# Patient Record
Sex: FEMALE | Race: WHITE | NOT HISPANIC OR LATINO | Employment: OTHER | URBAN - METROPOLITAN AREA
[De-identification: names, ages, dates, MRNs, and addresses within clinical notes are randomized per-mention and may not be internally consistent; named-entity substitution may affect disease eponyms.]

---

## 2017-12-12 ENCOUNTER — HISTORICAL (OUTPATIENT)
Dept: INTERNAL MEDICINE | Facility: CLINIC | Age: 62
End: 2017-12-12

## 2017-12-12 LAB
ABS NEUT (OLG): 4.36 X10(3)/MCL (ref 2.1–9.2)
ALBUMIN SERPL-MCNC: 3.3 GM/DL (ref 3.4–5)
ALBUMIN/GLOB SERPL: 1 RATIO (ref 1–2)
ALP SERPL-CCNC: 87 UNIT/L (ref 45–117)
ALT SERPL-CCNC: 19 UNIT/L (ref 12–78)
AST SERPL-CCNC: 13 UNIT/L (ref 15–37)
BASOPHILS # BLD AUTO: 0.03 X10(3)/MCL
BASOPHILS NFR BLD AUTO: 0 % (ref 0–1)
BILIRUB SERPL-MCNC: 0.2 MG/DL (ref 0.2–1)
BILIRUBIN DIRECT+TOT PNL SERPL-MCNC: <0.1 MG/DL
BILIRUBIN DIRECT+TOT PNL SERPL-MCNC: ABNORMAL MG/DL
BUN SERPL-MCNC: 13 MG/DL (ref 7–18)
CALCIUM SERPL-MCNC: 8.3 MG/DL (ref 8.5–10.1)
CHLORIDE SERPL-SCNC: 107 MMOL/L (ref 98–107)
CHOLEST SERPL-MCNC: 181 MG/DL
CHOLEST/HDLC SERPL: 2.9 {RATIO} (ref 0–4.4)
CO2 SERPL-SCNC: 28 MMOL/L (ref 21–32)
CREAT SERPL-MCNC: 0.8 MG/DL (ref 0.6–1.3)
EOSINOPHIL # BLD AUTO: 0.02 X10(3)/MCL
EOSINOPHIL NFR BLD AUTO: 0 % (ref 0–5)
ERYTHROCYTE [DISTWIDTH] IN BLOOD BY AUTOMATED COUNT: 11.1 % (ref 11.5–14.5)
EST. AVERAGE GLUCOSE BLD GHB EST-MCNC: 143 MG/DL
GLOBULIN SER-MCNC: 4 GM/ML (ref 2.3–3.5)
GLUCOSE SERPL-MCNC: 213 MG/DL (ref 74–106)
HBA1C MFR BLD: 6.6 % (ref 4.2–6.3)
HCT VFR BLD AUTO: 37.1 % (ref 35–46)
HDLC SERPL-MCNC: 62 MG/DL
HGB BLD-MCNC: 12.5 GM/DL (ref 12–16)
IMM GRANULOCYTES # BLD AUTO: 0.01 10*3/UL
IMM GRANULOCYTES NFR BLD AUTO: 0 %
LDLC SERPL CALC-MCNC: 88 MG/DL (ref 0–130)
LYMPHOCYTES # BLD AUTO: 1.77 X10(3)/MCL
LYMPHOCYTES NFR BLD AUTO: 27 % (ref 15–40)
MAGNESIUM SERPL-MCNC: 1.9 MG/DL (ref 1.8–2.4)
MCH RBC QN AUTO: 32 PG (ref 26–34)
MCHC RBC AUTO-ENTMCNC: 33.7 GM/DL (ref 31–37)
MCV RBC AUTO: 94.9 FL (ref 80–100)
MONOCYTES # BLD AUTO: 0.27 X10(3)/MCL
MONOCYTES NFR BLD AUTO: 4 % (ref 4–12)
NEUTROPHILS # BLD AUTO: 4.36 X10(3)/MCL
NEUTROPHILS NFR BLD AUTO: 67 X10(3)/MCL
PLATELET # BLD AUTO: 281 X10(3)/MCL (ref 130–400)
PMV BLD AUTO: 8.9 FL (ref 7.4–10.4)
POTASSIUM SERPL-SCNC: 3.7 MMOL/L (ref 3.5–5.1)
PROT SERPL-MCNC: 7.3 GM/DL (ref 6.4–8.2)
RBC # BLD AUTO: 3.91 X10(6)/MCL (ref 4–5.2)
SODIUM SERPL-SCNC: 142 MMOL/L (ref 136–145)
T4 FREE SERPL-MCNC: 0.75 NG/DL (ref 0.76–1.46)
TRIGL SERPL-MCNC: 155 MG/DL
TSH SERPL-ACNC: 0.28 MIU/L (ref 0.36–3.74)
VLDLC SERPL CALC-MCNC: 31 MG/DL
WBC # SPEC AUTO: 6.5 X10(3)/MCL (ref 4.5–11)

## 2019-04-15 ENCOUNTER — HISTORICAL (OUTPATIENT)
Dept: ADMINISTRATIVE | Facility: HOSPITAL | Age: 64
End: 2019-04-15

## 2022-04-07 ENCOUNTER — HISTORICAL (OUTPATIENT)
Dept: ADMINISTRATIVE | Facility: HOSPITAL | Age: 67
End: 2022-04-07

## 2022-04-24 VITALS
HEIGHT: 61 IN | BODY MASS INDEX: 24.77 KG/M2 | SYSTOLIC BLOOD PRESSURE: 152 MMHG | WEIGHT: 131.19 LBS | DIASTOLIC BLOOD PRESSURE: 87 MMHG

## 2022-05-05 ENCOUNTER — HOSPITAL ENCOUNTER (INPATIENT)
Facility: HOSPITAL | Age: 67
LOS: 4 days | Discharge: HOME | DRG: 683 | End: 2022-05-10
Attending: EMERGENCY MEDICINE | Admitting: INTERNAL MEDICINE
Payer: MEDICARE

## 2022-05-05 DIAGNOSIS — N17.9 ACUTE RENAL FAILURE: ICD-10-CM

## 2022-05-05 DIAGNOSIS — E86.0 ACUTE DEHYDRATION: Primary | ICD-10-CM

## 2022-05-05 DIAGNOSIS — N17.9 AKI (ACUTE KIDNEY INJURY): ICD-10-CM

## 2022-05-05 DIAGNOSIS — R07.9 CHEST PAIN: ICD-10-CM

## 2022-05-05 DIAGNOSIS — N18.4 CKD (CHRONIC KIDNEY DISEASE), STAGE IV: ICD-10-CM

## 2022-05-05 LAB
ALBUMIN SERPL-MCNC: 3.8 GM/DL (ref 3.4–4.8)
ALBUMIN/GLOB SERPL: 0.8 RATIO (ref 1.1–2)
ALP SERPL-CCNC: 109 UNIT/L (ref 40–150)
ALT SERPL-CCNC: 6 UNIT/L (ref 0–55)
AST SERPL-CCNC: 13 UNIT/L (ref 5–34)
BASOPHILS # BLD AUTO: 0.04 X10(3)/MCL (ref 0–0.2)
BASOPHILS NFR BLD AUTO: 0.5 %
BILIRUBIN DIRECT+TOT PNL SERPL-MCNC: 0.2 MG/DL (ref 0–0.5)
BILIRUBIN DIRECT+TOT PNL SERPL-MCNC: 0.3 MG/DL (ref 0–0.8)
BILIRUBIN DIRECT+TOT PNL SERPL-MCNC: 0.5 MG/DL
BUN SERPL-MCNC: 74.3 MG/DL (ref 9.8–20.1)
CALCIUM SERPL-MCNC: 9 MG/DL (ref 8.4–10.2)
CHLORIDE SERPL-SCNC: 105 MMOL/L (ref 98–107)
CO2 SERPL-SCNC: 16 MMOL/L (ref 23–31)
CREAT SERPL-MCNC: 6.59 MG/DL (ref 0.55–1.02)
EOSINOPHIL # BLD AUTO: 0.09 X10(3)/MCL (ref 0–0.9)
EOSINOPHIL NFR BLD AUTO: 1.1 %
ERYTHROCYTE [DISTWIDTH] IN BLOOD BY AUTOMATED COUNT: 13.5 % (ref 11.5–17)
GLOBULIN SER-MCNC: 4.6 GM/DL (ref 2.4–3.5)
GLUCOSE SERPL-MCNC: 111 MG/DL (ref 82–115)
HCT VFR BLD AUTO: 33.1 % (ref 37–47)
HGB BLD-MCNC: 10.2 GM/DL (ref 12–16)
IMM GRANULOCYTES # BLD AUTO: 0.04 X10(3)/MCL (ref 0–0.02)
IMM GRANULOCYTES NFR BLD AUTO: 0.5 % (ref 0–0.43)
LYMPHOCYTES # BLD AUTO: 1.82 X10(3)/MCL (ref 0.6–4.6)
LYMPHOCYTES NFR BLD AUTO: 21.3 %
MAGNESIUM SERPL-MCNC: 1.5 MG/DL (ref 1.6–2.6)
MCH RBC QN AUTO: 30.1 PG (ref 27–31)
MCHC RBC AUTO-ENTMCNC: 30.8 MG/DL (ref 33–36)
MCV RBC AUTO: 97.6 FL (ref 80–94)
MONOCYTES # BLD AUTO: 0.56 X10(3)/MCL (ref 0.1–1.3)
MONOCYTES NFR BLD AUTO: 6.6 %
NEUTROPHILS # BLD AUTO: 6 X10(3)/MCL (ref 2.1–9.2)
NEUTROPHILS NFR BLD AUTO: 70 %
NRBC BLD AUTO-RTO: 0 %
PLATELET # BLD AUTO: 253 X10(3)/MCL (ref 130–400)
PMV BLD AUTO: 10.5 FL (ref 9.4–12.4)
POTASSIUM SERPL-SCNC: 4.6 MMOL/L (ref 3.5–5.1)
PROT SERPL-MCNC: 8.4 GM/DL (ref 5.8–7.6)
RBC # BLD AUTO: 3.39 X10(6)/MCL (ref 4.2–5.4)
SODIUM SERPL-SCNC: 138 MMOL/L (ref 136–145)
TSH SERPL-ACNC: 0.38 UIU/ML (ref 0.35–4.94)
WBC # SPEC AUTO: 8.5 X10(3)/MCL (ref 4.5–11.5)

## 2022-05-05 PROCEDURE — 84443 ASSAY THYROID STIM HORMONE: CPT | Performed by: PHYSICIAN ASSISTANT

## 2022-05-05 PROCEDURE — 96360 HYDRATION IV INFUSION INIT: CPT

## 2022-05-05 PROCEDURE — 80053 COMPREHEN METABOLIC PANEL: CPT | Performed by: PHYSICIAN ASSISTANT

## 2022-05-05 PROCEDURE — 96361 HYDRATE IV INFUSION ADD-ON: CPT

## 2022-05-05 PROCEDURE — 99292 CRITICAL CARE ADDL 30 MIN: CPT

## 2022-05-05 PROCEDURE — 99291 CRITICAL CARE FIRST HOUR: CPT | Mod: 25

## 2022-05-05 PROCEDURE — 85025 COMPLETE CBC W/AUTO DIFF WBC: CPT | Performed by: PHYSICIAN ASSISTANT

## 2022-05-05 PROCEDURE — 83735 ASSAY OF MAGNESIUM: CPT | Performed by: PHYSICIAN ASSISTANT

## 2022-05-05 NOTE — Clinical Note
Diagnosis: Acute renal failure [184477]   Admitting Provider:: KENTRELL MARTINES [[id number]]   Future Attending Provider: KENTRELL MARTINES [[id number]]   Reason for IP Medical Treatment  (Clinical interventions that can only be accomplished in the IP setting? ) :: nephrology consult   Estimated Length of Stay:: 3-4 midnights   I certify that Inpatient services for greater than or equal to 2 midnights are medically necessary:: Yes   Plans for Post-Acute care--if anticipated (pick the single best option):: A. No post acute care anticipated at this time

## 2022-05-06 LAB
AMPHET UR QL SCN: NEGATIVE
APPEARANCE UR: CLEAR
BACTERIA #/AREA URNS AUTO: ABNORMAL /HPF
BARBITURATE SCN PRESENT UR: NEGATIVE
BENZODIAZ UR QL SCN: NEGATIVE
BILIRUB UR QL STRIP.AUTO: NEGATIVE MG/DL
C3 SERPL-MCNC: 133 MG/DL (ref 80–173)
C4 SERPL-MCNC: 23.3 MG/DL (ref 13–46)
CA-I SERPL-MCNC: 1.1 MMOL/L
CANNABINOIDS UR QL SCN: NEGATIVE
COCAINE UR QL SCN: NEGATIVE
COLOR UR AUTO: YELLOW
DEPRECATED CALCIDIOL+CALCIFEROL SERPL-MC: 17.4 NG/ML (ref 30–80)
FENTANYL UR QL SCN: NEGATIVE
FERRITIN SERPL-MCNC: 187.38 NG/ML (ref 4.63–204)
FOLATE SERPL-MCNC: 11.3 NG/ML (ref 7–31.4)
GLUCOSE UR QL STRIP.AUTO: NEGATIVE MG/DL
HCO3 VENOUS: 20.2 MMOL/L (ref 22–29)
HIV 1+2 AB+HIV1 P24 AG SERPL QL IA: NONREACTIVE
KETONES UR QL STRIP.AUTO: NEGATIVE MG/DL
LEUKOCYTE ESTERASE UR QL STRIP.AUTO: ABNORMAL UNIT/L
MDMA UR QL SCN: NEGATIVE
NITRITE UR QL STRIP.AUTO: NEGATIVE
O2 SATURATION VENOUS: 76 % (ref 60–85)
OPIATES UR QL SCN: NEGATIVE
PCO2 VENOUS: 46 MM HG (ref 38–51)
PCP UR QL: NEGATIVE
PH UR STRIP.AUTO: 6 [PH]
PH UR: 6 [PH] (ref 3–11)
PH VENOUS: 7.25 (ref 7.33–7.43)
PO2 VENOUS: 48 MM HG (ref 25–29)
POCT GLUCOSE: 184 MG/DL (ref 70–110)
PROT UR QL STRIP.AUTO: NEGATIVE MG/DL
PTH-INTACT SERPL-MCNC: 384.4 PG/ML (ref 8.7–77)
RBC #/AREA URNS AUTO: ABNORMAL /HPF
RBC UR QL AUTO: NEGATIVE UNIT/L
SARS-COV-2 RDRP RESP QL NAA+PROBE: NEGATIVE
SODIUM BLD-SCNC: 139 MMOL/L (ref 137–147)
SP GR UR STRIP.AUTO: 1.01 (ref 1–1.03)
SPECIFIC GRAVITY, URINE AUTO (.000) (OHS): 1.01 (ref 1–1.03)
SQUAMOUS #/AREA URNS AUTO: ABNORMAL /LPF
UROBILINOGEN UR STRIP-ACNC: 0.2 MG/DL
VIT B12 SERPL-MCNC: 310 PG/ML (ref 213–816)
WBC #/AREA URNS AUTO: ABNORMAL /HPF

## 2022-05-06 PROCEDURE — 86160 COMPLEMENT ANTIGEN: CPT | Performed by: INTERNAL MEDICINE

## 2022-05-06 PROCEDURE — 82728 ASSAY OF FERRITIN: CPT | Performed by: INTERNAL MEDICINE

## 2022-05-06 PROCEDURE — 86225 DNA ANTIBODY NATIVE: CPT | Performed by: INTERNAL MEDICINE

## 2022-05-06 PROCEDURE — 82784 ASSAY IGA/IGD/IGG/IGM EACH: CPT | Performed by: STUDENT IN AN ORGANIZED HEALTH CARE EDUCATION/TRAINING PROGRAM

## 2022-05-06 PROCEDURE — 81015 MICROSCOPIC EXAM OF URINE: CPT | Performed by: PHYSICIAN ASSISTANT

## 2022-05-06 PROCEDURE — 25000003 PHARM REV CODE 250: Performed by: EMERGENCY MEDICINE

## 2022-05-06 PROCEDURE — 11000001 HC ACUTE MED/SURG PRIVATE ROOM

## 2022-05-06 PROCEDURE — 83883 ASSAY NEPHELOMETRY NOT SPEC: CPT | Performed by: STUDENT IN AN ORGANIZED HEALTH CARE EDUCATION/TRAINING PROGRAM

## 2022-05-06 PROCEDURE — 63600175 PHARM REV CODE 636 W HCPCS: Performed by: PHYSICIAN ASSISTANT

## 2022-05-06 PROCEDURE — 36415 COLL VENOUS BLD VENIPUNCTURE: CPT | Performed by: INTERNAL MEDICINE

## 2022-05-06 PROCEDURE — 86140 C-REACTIVE PROTEIN: CPT | Performed by: INTERNAL MEDICINE

## 2022-05-06 PROCEDURE — 36415 COLL VENOUS BLD VENIPUNCTURE: CPT | Performed by: PHYSICIAN ASSISTANT

## 2022-05-06 PROCEDURE — 82306 VITAMIN D 25 HYDROXY: CPT | Performed by: INTERNAL MEDICINE

## 2022-05-06 PROCEDURE — 83970 ASSAY OF PARATHORMONE: CPT | Performed by: INTERNAL MEDICINE

## 2022-05-06 PROCEDURE — 87389 HIV-1 AG W/HIV-1&-2 AB AG IA: CPT | Performed by: STUDENT IN AN ORGANIZED HEALTH CARE EDUCATION/TRAINING PROGRAM

## 2022-05-06 PROCEDURE — 80307 DRUG TEST PRSMV CHEM ANLYZR: CPT | Performed by: PHYSICIAN ASSISTANT

## 2022-05-06 PROCEDURE — 81003 URINALYSIS AUTO W/O SCOPE: CPT | Mod: 59 | Performed by: PHYSICIAN ASSISTANT

## 2022-05-06 PROCEDURE — 87635 SARS-COV-2 COVID-19 AMP PRB: CPT | Performed by: EMERGENCY MEDICINE

## 2022-05-06 PROCEDURE — 83540 ASSAY OF IRON: CPT | Performed by: INTERNAL MEDICINE

## 2022-05-06 PROCEDURE — 82746 ASSAY OF FOLIC ACID SERUM: CPT | Performed by: INTERNAL MEDICINE

## 2022-05-06 PROCEDURE — 84165 PROTEIN E-PHORESIS SERUM: CPT | Performed by: STUDENT IN AN ORGANIZED HEALTH CARE EDUCATION/TRAINING PROGRAM

## 2022-05-06 PROCEDURE — 82607 VITAMIN B-12: CPT | Performed by: PHYSICIAN ASSISTANT

## 2022-05-06 RX ORDER — AMLODIPINE BESYLATE 10 MG/1
10 TABLET ORAL DAILY
COMMUNITY

## 2022-05-06 RX ORDER — DIPHENOXYLATE HYDROCHLORIDE AND ATROPINE SULFATE 2.5; .025 MG/1; MG/1
1 TABLET ORAL EVERY 6 HOURS PRN
Status: DISCONTINUED | OUTPATIENT
Start: 2022-05-06 | End: 2022-05-10 | Stop reason: HOSPADM

## 2022-05-06 RX ORDER — GLUCAGON 1 MG
1 KIT INJECTION
Status: DISCONTINUED | OUTPATIENT
Start: 2022-05-06 | End: 2022-05-10 | Stop reason: HOSPADM

## 2022-05-06 RX ORDER — BUPROPION HYDROCHLORIDE 150 MG/1
150 TABLET ORAL DAILY
Status: DISCONTINUED | OUTPATIENT
Start: 2022-05-07 | End: 2022-05-10 | Stop reason: HOSPADM

## 2022-05-06 RX ORDER — BUPRENORPHINE AND NALOXONE 8; 2 MG/1; MG/1
1 FILM, SOLUBLE BUCCAL; SUBLINGUAL 2 TIMES DAILY
COMMUNITY
Start: 2022-03-31

## 2022-05-06 RX ORDER — BUPRENORPHINE AND NALOXONE 8; 2 MG/1; MG/1
1 FILM, SOLUBLE BUCCAL; SUBLINGUAL 2 TIMES DAILY
Status: CANCELLED | OUTPATIENT
Start: 2022-05-06

## 2022-05-06 RX ORDER — PHENOL/SODIUM PHENOLATE
1 AEROSOL, SPRAY (ML) MUCOUS MEMBRANE EVERY MORNING
COMMUNITY
Start: 2021-11-11

## 2022-05-06 RX ORDER — CYCLOBENZAPRINE HCL 5 MG
5 TABLET ORAL 3 TIMES DAILY PRN
Status: DISCONTINUED | OUTPATIENT
Start: 2022-05-06 | End: 2022-05-10 | Stop reason: HOSPADM

## 2022-05-06 RX ORDER — SODIUM CHLORIDE 0.9 % (FLUSH) 0.9 %
10 SYRINGE (ML) INJECTION EVERY 12 HOURS PRN
Status: DISCONTINUED | OUTPATIENT
Start: 2022-05-06 | End: 2022-05-10 | Stop reason: HOSPADM

## 2022-05-06 RX ORDER — PANTOPRAZOLE SODIUM 40 MG/1
40 TABLET, DELAYED RELEASE ORAL DAILY
Status: DISCONTINUED | OUTPATIENT
Start: 2022-05-07 | End: 2022-05-10 | Stop reason: HOSPADM

## 2022-05-06 RX ORDER — LISINOPRIL 10 MG/1
10 TABLET ORAL NIGHTLY
Status: ON HOLD | COMMUNITY
Start: 2022-02-07 | End: 2022-05-10 | Stop reason: HOSPADM

## 2022-05-06 RX ORDER — MAGNESIUM SULFATE 1 G/100ML
1 INJECTION INTRAVENOUS ONCE
Status: COMPLETED | OUTPATIENT
Start: 2022-05-06 | End: 2022-05-06

## 2022-05-06 RX ORDER — BUPRENORPHINE AND NALOXONE 8; 2 MG/1; MG/1
1 FILM, SOLUBLE BUCCAL; SUBLINGUAL 2 TIMES DAILY
Status: DISCONTINUED | OUTPATIENT
Start: 2022-05-06 | End: 2022-05-06

## 2022-05-06 RX ORDER — NALOXONE HCL 0.4 MG/ML
0.02 VIAL (ML) INJECTION
Status: DISCONTINUED | OUTPATIENT
Start: 2022-05-06 | End: 2022-05-10 | Stop reason: HOSPADM

## 2022-05-06 RX ORDER — DIPHENOXYLATE HYDROCHLORIDE AND ATROPINE SULFATE 2.5; .025 MG/1; MG/1
1 TABLET ORAL EVERY 6 HOURS PRN
COMMUNITY
Start: 2022-04-25

## 2022-05-06 RX ORDER — IBUPROFEN 200 MG
24 TABLET ORAL
Status: DISCONTINUED | OUTPATIENT
Start: 2022-05-06 | End: 2022-05-10 | Stop reason: HOSPADM

## 2022-05-06 RX ORDER — ASPIRIN 325 MG
TABLET, DELAYED RELEASE (ENTERIC COATED) ORAL
COMMUNITY

## 2022-05-06 RX ORDER — BUPROPION HYDROCHLORIDE 150 MG/1
150 TABLET ORAL DAILY
COMMUNITY

## 2022-05-06 RX ORDER — CYCLOBENZAPRINE HCL 5 MG
5 TABLET ORAL
Status: ON HOLD | COMMUNITY
End: 2022-05-10 | Stop reason: HOSPADM

## 2022-05-06 RX ORDER — INSULIN ASPART 100 [IU]/ML
0-5 INJECTION, SOLUTION INTRAVENOUS; SUBCUTANEOUS
Status: DISCONTINUED | OUTPATIENT
Start: 2022-05-06 | End: 2022-05-10 | Stop reason: HOSPADM

## 2022-05-06 RX ORDER — GABAPENTIN 800 MG/1
800 TABLET ORAL 2 TIMES DAILY
COMMUNITY
Start: 2022-04-17

## 2022-05-06 RX ORDER — ONDANSETRON 2 MG/ML
4 INJECTION INTRAMUSCULAR; INTRAVENOUS EVERY 8 HOURS PRN
Status: DISCONTINUED | OUTPATIENT
Start: 2022-05-06 | End: 2022-05-10 | Stop reason: HOSPADM

## 2022-05-06 RX ORDER — AMLODIPINE BESYLATE 5 MG/1
10 TABLET ORAL DAILY
Status: DISCONTINUED | OUTPATIENT
Start: 2022-05-07 | End: 2022-05-10 | Stop reason: HOSPADM

## 2022-05-06 RX ORDER — ACETAMINOPHEN 325 MG/1
650 TABLET ORAL EVERY 4 HOURS PRN
Status: DISCONTINUED | OUTPATIENT
Start: 2022-05-06 | End: 2022-05-10 | Stop reason: HOSPADM

## 2022-05-06 RX ORDER — IBUPROFEN 200 MG
16 TABLET ORAL
Status: DISCONTINUED | OUTPATIENT
Start: 2022-05-06 | End: 2022-05-10 | Stop reason: HOSPADM

## 2022-05-06 RX ORDER — SODIUM CHLORIDE 9 MG/ML
1000 INJECTION, SOLUTION INTRAVENOUS
Status: COMPLETED | OUTPATIENT
Start: 2022-05-06 | End: 2022-05-06

## 2022-05-06 RX ORDER — BUPRENORPHINE HYDROCHLORIDE 8 MG/1
8 TABLET SUBLINGUAL 2 TIMES DAILY
Status: DISCONTINUED | OUTPATIENT
Start: 2022-05-06 | End: 2022-05-10 | Stop reason: HOSPADM

## 2022-05-06 RX ORDER — DULOXETIN HYDROCHLORIDE 30 MG/1
30 CAPSULE, DELAYED RELEASE ORAL NIGHTLY
Status: ON HOLD | COMMUNITY
End: 2022-05-10 | Stop reason: HOSPADM

## 2022-05-06 RX ORDER — HYDROCODONE BITARTRATE AND ACETAMINOPHEN 5; 325 MG/1; MG/1
1 TABLET ORAL EVERY 6 HOURS PRN
Status: DISCONTINUED | OUTPATIENT
Start: 2022-05-06 | End: 2022-05-10 | Stop reason: HOSPADM

## 2022-05-06 RX ORDER — CHLORTHALIDONE 25 MG/1
25 TABLET ORAL NIGHTLY
Status: ON HOLD | COMMUNITY
End: 2022-05-10 | Stop reason: HOSPADM

## 2022-05-06 RX ADMIN — MAGNESIUM SULFATE IN DEXTROSE 1 G: 10 INJECTION, SOLUTION INTRAVENOUS at 11:05

## 2022-05-06 RX ADMIN — SODIUM CHLORIDE 1000 ML: 9 INJECTION, SOLUTION INTRAVENOUS at 03:05

## 2022-05-06 RX ADMIN — SODIUM BICARBONATE: 84 INJECTION, SOLUTION INTRAVENOUS at 06:05

## 2022-05-06 RX ADMIN — SODIUM CHLORIDE 1000 ML: 9 INJECTION, SOLUTION INTRAVENOUS at 02:05

## 2022-05-06 NOTE — H&P
[facility] Hospital Medicine History & Physical Examination       Patient Name: Kriss Vallejo  MRN: [id number]  Patient Class: IP- Inpatient   Admission Date: 5/5/2022  8:43 PM  Admitting Physician: Dr. Hernandez  Length of Stay: 0  Attending Physician: Dr. Hernandez  Primary Care Provider: SHERINE Cano  Face-to-Face encounter date: 05/06/2022  Code Status: Full code  Chief Complaint: tremors    Patient information was obtained from patient, patient's family, past medical records and ER records.     HISTORY OF PRESENT ILLNESS:   Kriss Vallejo is a 66 y.o. female who has a history of hypertension,chronic back pain on neurontin and suboxone, and tobacco abuse who presented to [facility] on 5/5/2022 with  complaints of constant generalized tremors mostly in her bilateral upper extremities, forgetfulness, nausea, and persistent, non-bloody diarrhea over the past x3-4 months without any specific preceding factors. She states that she decided to come to the hospital today because her daughter had to go to the hospital for an OB issue and she figured she would get evaluated. She denies any sick contacts or recent travel. She denies any personal or family history of neurological, GI, or kidney issues in the past. She continues to eat and drink normally at home and urinates without difficulty. She admits to smoking a half a pack of cigarettes daily and occasional alcohol use, but denies illicit drugs. She denies CP, SOB, cough, fever, vomiting, hematuria, blood in her stool, abdominal pain, constipation, lower extremity numbness/tingling, urinary/bowel incontinence, or dysuria.     Her initial vital signs were stable.  Her labs showed a bicarb 16, BUN 74.3, creatinine 6.59, and a magnesium 1.50.  A VBG showed a pH 7.25, HCO3 20.2, pO 48, and a pCO2 of 46. COVID-19 negative.  PAST MEDICAL HISTORY:   Hypertension  Tobacco use  Chronic back pain  RLE sciatica  PAST SURGICAL HISTORY:   BACK  "SURGERY   Biopsy of liver   Exploratory laparotomy with biopsy   Hysterectomy    LIVER SURGERY   mediport insertion and removal  ALLERGIES:   Patient has no known allergies.    FAMILY HISTORY:   Alcoholism.: Father.   Cancer, HTN: Mother.   Cancer - unknown origin: Sister.     SOCIAL HISTORY:   Alcohol- occasional  Tobacco use- half a pack daily  Illicit drugs- previous narcotic abuse on suboxone    HOME MEDICATIONS:     Prior to Admission medications    Not on File       REVIEW OF SYSTEMS:   Except as documented, all other systems reviewed and negative     PHYSICAL EXAM:   /75   Pulse 74   Temp 98.4 °F (36.9 °C) (Oral)   Resp 18   Ht 5' 0.63" (1.54 m)   Wt 59 kg (130 lb 1.1 oz)   SpO2 97%   BMI 24.88 kg/m²     Vitals Reviewed  General: well-developed well-nourished in no acute distress, AAOx3, answers all questions appropriately although sometimes she does take a while to answer  Eye: clear conjunctiva, eyelids normal  HENT: oropharynx and nasal mucosal surfaces moist  Neck: full range of motion  Respiratory: clear to auscultation bilaterally, no respiratory distress  Cardiovascular: regular rate and rhythm without gallops or rubs  Gastrointestinal: soft, non-tender, non-distended with normal bowel sounds, without masses to palpation  Genitourinary: no CVA tenderness to palpation  Musculoskeletal:5/5 strength in bilateral upper extremities, 3/5 strength in bilateral lower extremities with positive straight leg raise bilaterally  Integumentary: warm, dry  Neurologic: cranial nerves intact, sensation intact, no active tremors noted    LABS AND IMAGING:     Admission on 05/05/2022   Component Date Value    Sodium Level 05/05/2022 138     Potassium Level 05/05/2022 4.6     Chloride 05/05/2022 105     Carbon Dioxide 05/05/2022 16 (A)    Glucose Level 05/05/2022 111     Blood Urea Nitrogen 05/05/2022 74.3 (A)    Creatinine 05/05/2022 6.59 (A)    Calcium Level Total 05/05/2022 9.0     Protein Total " 05/05/2022 8.4 (A)    Albumin Level 05/05/2022 3.8     Globulin 05/05/2022 4.6 (A)    Albumin/Globulin Ratio 05/05/2022 0.8 (A)    Bilirubin Total 05/05/2022 0.5     Bilirubin Direct 05/05/2022 0.2     Bilirubin Indirect 05/05/2022 0.30     Alkaline Phosphatase 05/05/2022 109     Alanine Aminotransferase 05/05/2022 6     Aspartate Aminotransfera* 05/05/2022 13     Estimated GFR-Non Shirley* 05/05/2022 7     Magnesium Level 05/05/2022 1.50 (A)    Thyroid Stimulating Horm* 05/05/2022 0.3766     WBC 05/05/2022 8.5     RBC 05/05/2022 3.39 (A)    Hgb 05/05/2022 10.2 (A)    Hct 05/05/2022 33.1 (A)    MCV 05/05/2022 97.6 (A)    MCH 05/05/2022 30.1     MCHC 05/05/2022 30.8 (A)    RDW 05/05/2022 13.5     Platelet 05/05/2022 253     MPV 05/05/2022 10.5     Neutro Auto 05/05/2022 70.0     Lymph Auto 05/05/2022 21.3     Mono Auto 05/05/2022 6.6     Eos Auto 05/05/2022 1.1     Basophil Auto 05/05/2022 0.5     Abs Lymph 05/05/2022 1.82     Abs Neutro 05/05/2022 6.0     Abs Mono 05/05/2022 0.56     Abs Eos 05/05/2022 0.09     Abs Baso 05/05/2022 0.04     IG# 05/05/2022 0.04 (A)    IG% 05/05/2022 0.5 (A)    NRBC% 05/05/2022 0.0     pH, Ta 05/06/2022 7.25 (A)    pCO2, Ta 05/06/2022 46     pO2, Ta 05/06/2022 48 (A)    Sodium 05/06/2022 139     Ionized Calcium 05/06/2022 1.10     HCO3, Ta 05/06/2022 20.2 (A)    O2 Sat, Ta 05/06/2022 76     SARS COV-2 MOLECULAR 05/06/2022 Negative         Microbiology Results (last 7 days)     ** No results found for the last 168 hours. **           No results found.- pulls last radiology orders     US Retroperitoneal Complete    (Results Pending)   X-Ray Chest 1 View    (Results Pending)         ASSESSMENT & PLAN:   Acute renal failure  Metabolic acidosis  Acute hypoxemic respiratory, improved and not requiring O2 supplementation  Hypomagnesemia  Macrocytic anemia  Tremors  Chronic diarrhea  Possible early dementia  History of hypertension  History of  chronic back pain on Neurontin and suboxone  History of tobacco use    Plan:  -continue with oxygen supplementation as needed, but not currently requiring O2 supplementation and she has no respiratory symptoms  -CXR for further evaluation of hypoxia  -start a renal diet and continue with bicarb gtt  -U/S retroperitoneum-pending  -urine drug screen, UA, [id number]  -nephrology consulted  -magnesium sulfate 1 g IV x1 dose given  -will obtain a stool culture, fecal leukocytes, C. Diff to rule out any causes for her chronic diarrhea which may have lead to dehydration and contributed to the AFSHAN. If negative she would benefit from an outpatient GI evaluation for persistent diarrhea a possible scope.  -She is does not actively have tremors, but should follow up with a neurologist as an outpatient for further evaluation since there does not seem to be any acute neurological issue  -repeat labs in am    VTE Prophylaxis: will be placed on SCD for DVT prophylaxis and will be advised to be as mobile as possible and sit in a chair as tolerated    _________________  Discharge Planning and Disposition: No mobility needs. Ambulating well. Good social support system.   Anticipated discharge  IMendez, NP/PA have reviewed and discussed the case with Dr. Hernandez.   Please see the following addendum for further assessment and plan from there attending MD.  05/06/2022    _____________________________________________________________________    MD Addendum:  I, Dr Hernandez assumed care of this patient today at 11:30 am   For the patient encounter, I performed the substantive portion of the visit, I reviewed the NP/PA documentation, treatment plan, and medical decision making.  I had face to face time with this patient     A 66 years old female with a history of hypertension chronic back pain on Neurontin Suboxone and smoking presented to the hospital because she was having movement issues.  She also admitted that she was having  diarrhea for last 3-4 months however especially lately with decreased she is complaining of tremors for the last month she also has some slowing in walking.  She also complains of combination recently.   She states that she is making good amount of urine, does not have fever chills sweating nausea vomiting abdominal pain, nausea having chest pain shortness of breath lightheadedness dizziness.   In the hospital patient was found to have elevated creatinine and BUN she was admitted to the hospital.     Physical exam  Oriented  X3 no acute distress following commands properly  Cardiac regular rate and rhythm, no murmur or gallop  Respiratory clear bilaterally on room air non labored  Abdomen soft nontender non distended no rigidity or rebound.   Extremities no edema    Plan:   - her bicarb is 16, continue bicarb drip 75 cc/hours.   - nephrology is on board we will hold off on dialysis for now.   - C diff stool culture ova and parasites, transglutaminase antibody to rule out celiac  -will consider consulting GI if these are negative due to chronic diarrhea.   - HIV, hepatitis-B and C  - MATT panel complements, urine protein creatinine ratio  - renal ultrasound.   - continue appropriate home medications  - due to parkinsonian like symptoms will get MRI brain without contrast, if symptoms does not resolve, consider Neurology consult.     Critical care diagnoses:  Severe renal failure requiring bicarb dripp close monitoring  Critical care time was given 45 minutes    All diagnosis and differential diagnosis have been reviewed; assessment and plan has been documented; I have personally reviewed the labs and test results that are presently available; I have reviewed the patients medication list; I have reviewed the consulting providers response and recommendations. I have reviewed or attempted to review medical records based upon their availability.    All of the patient and family questions have been addressed and answered.  Patient's is agreeable to the above stated plan. I will continue to monitor closely and make adjustments to medical management as needed.    VIDA Nguyen   05/06/2022

## 2022-05-06 NOTE — FIRST PROVIDER EVALUATION
Medical screening exam completed.  I have conducted a focused provider triage encounter, findings are as follows:    Brief history of present illness:  65 yo female presents to ED for evaluation of tremors and forgetfulness over the past few months. Currently on neurotin and suboxone for back pain.    /76     HR 76  O2:94%     Pertinent physical exam:  Awake, alert and oriented. Tremor noted to hands.     Brief workup plan:  Will get basic labs.    Preliminary workup initiated; this workup will be continued and followed by the physician or advanced practice provider that is assigned to the patient when roomed.

## 2022-05-06 NOTE — CONSULTS
"                [facility]                       [address] [address]    PATIENT NAME:       OPHELIA KRUSE  YOB: 1955  CSN:                [id number]   MRN:                [id number]  ADMIT DATE:         2022 20:43:00  PHYSICIAN:          Cosmo Mims MD                            CONSULTATION    DATE OF CONSULT:      HISTORY OF PRESENT ILLNESS:  This is a very pleasant, 66-year-old female,   resident of [place], [place], who was admitted with   advanced azotemia with a creatinine of 6.59.  The patient herself has had a   history of diarrhea now for "months," she has a history of hypertension,   chronic back pain, previously diagnosed with sciatica, she is on chronic   Neurontin and polymyalgia.  She does continue to smoke.  But she denied any recent change in   urine color, no dark-colored or tea-colored urine, certainly no hematuria, no   history of nephrolithiasis, no flank pain, and no family history of renal   disease. No frequent or habitual NSAID use.  No rashes or joint swellings.  She states she drinks plenty of water throughout the day and her urine   generally looks clear.    PAST MEDICAL HISTORY: Sciatica -Rt, HTN, diarrhea.  Generalized degenerative joint disease aches and pains without synovitis. Polymyalgia.    PAST SURGICAL HISTORY:  Her past surgical history includes previous back   surgery.  She has also had a partial hepatectomy for a diagnosis of liver cancer   about 30 years ago, Mediport insertion and subsequent removal.  She underwent   exploratory laparotomy for the partial hepatectomy as well as liver biopsy.         FAMILY HISTORY:  Her father is , he was alcoholic.  Mother had   hypertension and  of cancer.  She has siblings.    SOCIAL HISTORY:  She drinks very rarely, smokes about a half pack a day, and   previous use of narcotics on Suboxone.    ALLERGIES:  NONE KNOW. "     MEDICATIONS:   Gabapentin    REVIEW OF SYSTEMS:  Otherwise she denies any abnormal skin rashes.  She denies any syncope or loss   of consciousness.  There have been no fever or chills, no   significant weight loss.  She denies any chest pain or history of coronary   artery intervention.  Denies any history of stroke.  She denies any rectal   bleeding along with the diarrhea.  She has had some nausea but no discrete   vomiting, and the rest of the review is unremarkable except for some tremors   which could be attributed to the gabapentin which she takes at home 800 mg   b.i.d. for her degenerative disease pain as well as for her polymyalgia    PHYSICAL EXAMINATION:  GENERAL:  Physical examination reveals a pleasant, well-developed and nourished   female.  She is awake, alert, and in no distress.  VITAL SIGNS:  Her current blood pressure is 117/63, heart rate is 70,   respiratory rate is 12, she is afebrile.  HEENT:  Atraumatic.  Extraocular movements were normal.  No icterus.  Teeth are   in poor repair.    NECK:  No venous distention, no bruits, no significant lymphadenopathy.  LUNGS:  The lung fields were clear in all fields.    HEART:  Regular without rub.  ABDOMEN:  Soft, nontender, well-healed midline incision from exploratory   laparotomy years ago.  No organomegaly appreciable, no abdominal tenderness.    Bowel sounds were positive.  Abdominal and renal ultrasound are currently in   progress.  The right kidney so far appears to be normal at about 12.3 cm.    EXTREMITIES:  Peripherally she has no clubbing, no cyanosis, no edema.  SKIN:  No evidence of any abnormal rashes.    MUSCULOSKELETAL:  I could not detect any evidence of synovitis or arthritis per   se.    LABORATORY DATA:  Her laboratory data shows a potassium of 4.6, her bicarbonate   is low at 16, BUN and creatinine 74 and 6.6 respectively.  Her magnesium is   slightly low at 1.5.  Globulins are slightly high at 4.6.  Total protein is   slightly  elevated at 8.4.    IMPRESSION:  1. Acute kidney injury-serum creatinine is essentially normal approximately 2 or 3 years ago.  2. History of diarrhea now for 'several weeks to few months' duration.  3. Anemia of chronic disease with a hemoglobin of 10.  4. Mild elevation of globulins.  5. Remote history of hepatoma.  6. DJD and Plymyalgia    PLAN:  For the moment I believe we should continue with cautious hydration with D5 and bicarbonate at 75 an hour.  Will check for any evidence of dysproteinemia with a protein electrophoresis.  Autoimmune evaluation as well as inflammatory markers.  For the moment she is asymptomatic.  There is no   Urgency for any acute intervention. We will   continue to follow with interest.        ______________________________  MD KASSANDRA Stevens/HEATH  DD:  05/06/2022  Time:  09:45AM  DT:  05/06/2022  Time:  10:29AM  Job #:  [id number]      CONSULTATION

## 2022-05-06 NOTE — ED PROVIDER NOTES
Encounter Date: 5/5/2022    SCRIBE #1 NOTE: I, Sujata Joel, am scribing for, and in the presence of,  Dr. Weiss. I have scribed the following portions of the note - Other sections scribed: HPI, ROS, PE.       History     Chief Complaint   Patient presents with    Tremors     Reports generalized tremors for a couple months. Pt reports on gabapentin for sciatic pain which helps tremors.      66 year old female with a hx of HTN presents to the ED for tremors beginning 3-4 months ago. The patient reports that she notices it more during the night. She has intermittent nausea, vomiting, diarrhea, polyuria, polydipsia, and currently has a headache, but denies extremity weakness, extremity pain, or cough. She does not currently have a primary care physician.     The history is provided by the patient. No  was used.   General Illness   Illness onset: 3-4 months ago. The problem occurs frequently. The problem has been unchanged. The pain is at a severity of 0/10. Nothing relieves the symptoms. Nothing aggravates the symptoms. Associated symptoms include diarrhea, nausea, vomiting and headaches. Pertinent negatives include no fever, no abdominal pain, no constipation, no congestion, no ear pain, no sore throat, no stridor, no cough, no shortness of breath, no wheezing and no rash. She has received no recent medical care.     Review of patient's allergies indicates:  No Known Allergies  No past medical history on file.  No past surgical history on file.  No family history on file.     Review of Systems   Constitutional: Negative for activity change, chills, diaphoresis, fatigue and fever.   HENT: Negative for congestion, ear pain, sinus pain and sore throat.    Eyes: Negative for visual disturbance.   Respiratory: Negative for cough, shortness of breath, wheezing and stridor.    Cardiovascular: Negative for chest pain, palpitations and leg swelling.   Gastrointestinal: Positive for diarrhea, nausea and  vomiting. Negative for abdominal pain, constipation and rectal pain.   Genitourinary: Negative for dysuria and hematuria.        Polyuria, polydipsia   Musculoskeletal: Negative for arthralgias, back pain and myalgias.   Skin: Negative for rash.   Neurological: Positive for tremors and headaches. Negative for dizziness, syncope, weakness and numbness.   All other systems reviewed and are negative.      Physical Exam     Initial Vitals [05/05/22 2041]   BP Pulse Resp Temp SpO2   132/76 76 20 97.2 °F (36.2 °C) (!) 94 %      MAP       --         Physical Exam    Nursing note and vitals reviewed.  Constitutional: No distress.   HENT:   Head: Normocephalic and atraumatic.   Slightly dry mucous membranes   Eyes: EOM are normal.   Neck: Trachea normal. Neck supple.   Normal range of motion.  Cardiovascular: Normal rate and regular rhythm.   No murmur heard.  Pulmonary/Chest: Breath sounds normal. No respiratory distress.   Abdominal: Abdomen is soft. Bowel sounds are normal. She exhibits no distension. There is no abdominal tenderness. There is no rebound and no guarding.   Musculoskeletal:         General: Normal range of motion.      Cervical back: Normal range of motion and neck supple.      Lumbar back: Normal range of motion.     Neurological: She is alert and oriented to person, place, and time. She has normal strength.   Skin: Skin is warm and dry. No rash noted.   Psychiatric: She has a normal mood and affect.         ED Course   Critical Care    Date/Time: 5/6/2022 3:55 AM  Performed by: Jamey Weiss MD  Authorized by: Jamey Weiss MD   Direct patient critical care time: 45 minutes  Ordering / reviewing critical care time: 15 minutes  Documentation critical care time: 10 minutes  Consulting other physicians critical care time: 10 minutes  Total critical care time (exclusive of procedural time) : 80 minutes  Critical care was necessary to treat or prevent imminent or life-threatening deterioration of  the following conditions: circulatory failure, renal failure and dehydration.  Critical care was time spent personally by me on the following activities: discussions with consultants, discussions with primary provider, examination of patient, evaluation of patient's response to treatment, ordering and performing treatments and interventions and re-evaluation of patient's condition.        Labs Reviewed   COMPREHENSIVE METABOLIC PANEL - Abnormal; Notable for the following components:       Result Value    Carbon Dioxide 16 (*)     Blood Urea Nitrogen 74.3 (*)     Creatinine 6.59 (*)     Protein Total 8.4 (*)     Globulin 4.6 (*)     Albumin/Globulin Ratio 0.8 (*)     All other components within normal limits   MAGNESIUM - Abnormal; Notable for the following components:    Magnesium Level 1.50 (*)     All other components within normal limits   CBC WITH DIFFERENTIAL - Abnormal; Notable for the following components:    RBC 3.39 (*)     Hgb 10.2 (*)     Hct 33.1 (*)     MCV 97.6 (*)     MCHC 30.8 (*)     IG# 0.04 (*)     IG% 0.5 (*)     All other components within normal limits   TSH - Normal   CBC W/ AUTO DIFFERENTIAL    Narrative:     The following orders were created for panel order CBC auto differential.  Procedure                               Abnormality         Status                     ---------                               -----------         ------                     CBC with Differential[[id number]]        Abnormal            Final result                 Please view results for these tests on the individual orders.   URINALYSIS, REFLEX TO URINE CULTURE   DRUG SCREEN, URINE (BEAKER)          Imaging Results    None          Medications   sodium bicarbonate 150 mEq in dextrose 5 % 1,000 mL infusion (has no administration in time range)   sodium chloride 0.9% bolus 1,000 mL (1,000 mLs Intravenous New Bag 5/6/22 0215)   0.9%  NaCl infusion (1,000 mLs Intravenous New Bag 5/6/22 0330)              Scribe  "Attestation:   Scribe #1: I performed the above scribed service and the documentation accurately describes the services I performed. I attest to the accuracy of the note.    Attending Attestation:           Physician Attestation for Scribe:  Physician Attestation Statement for Scribe #1: I, reviewed documentation, as scribed by Sujata Joel in my presence, and it is both accurate and complete.             ED Course as of 05/06/22 0409   Fri May 06, 2022   0339 Consult  [MM]   0339 Renal  [MM]   0353 I spoke with ANTHONY Castano on-call for Dr. Sarkar, nephrology.  Recommends bicarb infusion, 3 amps by "added to 1 L half-normal saline at 75 cc/hour   [KG]   0354 Jelani NP  recommends nephrology consult and okay to admit [KG]   0359 Patient doing well, vital signs are stable.  Patient has no complaints at this time.  Normal saline is infusing [KG]      ED Course User Index  [KG] Jamey Weiss MD  [MM] Sujata Joel             Clinical Impression:   Final diagnoses:  [N17.9] Acute renal failure  [E86.0] Acute dehydration (Primary)          ED Disposition Condition    Admit               Jamey Weiss MD  05/06/22 0409    "

## 2022-05-07 PROBLEM — N17.9 AKI (ACUTE KIDNEY INJURY): Status: ACTIVE | Noted: 2022-05-07

## 2022-05-07 PROBLEM — N18.4 CKD (CHRONIC KIDNEY DISEASE), STAGE IV: Status: ACTIVE | Noted: 2022-05-07

## 2022-05-07 LAB
ALBUMIN SERPL-MCNC: 3.1 GM/DL (ref 3.4–4.8)
ALBUMIN/GLOB SERPL: 0.9 RATIO (ref 1.1–2)
ALP SERPL-CCNC: 88 UNIT/L (ref 40–150)
ALT SERPL-CCNC: 5 UNIT/L (ref 0–55)
AST SERPL-CCNC: 7 UNIT/L (ref 5–34)
BASOPHILS # BLD AUTO: 0.04 X10(3)/MCL (ref 0–0.2)
BASOPHILS NFR BLD AUTO: 0.7 %
BILIRUBIN DIRECT+TOT PNL SERPL-MCNC: 0.1 MG/DL (ref 0–0.5)
BILIRUBIN DIRECT+TOT PNL SERPL-MCNC: 0.2 MG/DL (ref 0–0.8)
BILIRUBIN DIRECT+TOT PNL SERPL-MCNC: 0.3 MG/DL
BUN SERPL-MCNC: 62.5 MG/DL (ref 9.8–20.1)
CALCIUM SERPL-MCNC: 8.1 MG/DL (ref 8.4–10.2)
CHLORIDE SERPL-SCNC: 103 MMOL/L (ref 98–107)
CO2 SERPL-SCNC: 21 MMOL/L (ref 23–31)
CREAT SERPL-MCNC: 5.07 MG/DL (ref 0.55–1.02)
EOSINOPHIL # BLD AUTO: 0.23 X10(3)/MCL (ref 0–0.9)
EOSINOPHIL NFR BLD AUTO: 3.9 %
ERYTHROCYTE [DISTWIDTH] IN BLOOD BY AUTOMATED COUNT: 13.4 % (ref 11.5–17)
GLOBULIN SER-MCNC: 3.3 GM/DL (ref 2.4–3.5)
GLUCOSE SERPL-MCNC: 232 MG/DL (ref 82–115)
HCT VFR BLD AUTO: 25.9 % (ref 37–47)
HGB BLD-MCNC: 8.7 GM/DL (ref 12–16)
IMM GRANULOCYTES # BLD AUTO: 0.02 X10(3)/MCL (ref 0–0.02)
IMM GRANULOCYTES NFR BLD AUTO: 0.3 % (ref 0–0.43)
LYMPHOCYTES # BLD AUTO: 1.71 X10(3)/MCL (ref 0.6–4.6)
LYMPHOCYTES NFR BLD AUTO: 28.8 %
MCH RBC QN AUTO: 30.2 PG (ref 27–31)
MCHC RBC AUTO-ENTMCNC: 33.6 MG/DL (ref 33–36)
MCV RBC AUTO: 89.9 FL (ref 80–94)
MONOCYTES # BLD AUTO: 0.45 X10(3)/MCL (ref 0.1–1.3)
MONOCYTES NFR BLD AUTO: 7.6 %
NEUTROPHILS # BLD AUTO: 3.5 X10(3)/MCL (ref 2.1–9.2)
NEUTROPHILS NFR BLD AUTO: 58.7 %
NRBC BLD AUTO-RTO: 0 %
PLATELET # BLD AUTO: 218 X10(3)/MCL (ref 130–400)
PMV BLD AUTO: 10.8 FL (ref 9.4–12.4)
POTASSIUM SERPL-SCNC: 4.5 MMOL/L (ref 3.5–5.1)
PROT SERPL-MCNC: 6.4 GM/DL (ref 5.8–7.6)
RBC # BLD AUTO: 2.88 X10(6)/MCL (ref 4.2–5.4)
SODIUM SERPL-SCNC: 137 MMOL/L (ref 136–145)
WBC # SPEC AUTO: 5.9 X10(3)/MCL (ref 4.5–11.5)

## 2022-05-07 PROCEDURE — 87045 FECES CULTURE AEROBIC BACT: CPT | Performed by: PHYSICIAN ASSISTANT

## 2022-05-07 PROCEDURE — 85025 COMPLETE CBC W/AUTO DIFF WBC: CPT | Performed by: PHYSICIAN ASSISTANT

## 2022-05-07 PROCEDURE — 25000003 PHARM REV CODE 250: Performed by: STUDENT IN AN ORGANIZED HEALTH CARE EDUCATION/TRAINING PROGRAM

## 2022-05-07 PROCEDURE — 36415 COLL VENOUS BLD VENIPUNCTURE: CPT | Performed by: PHYSICIAN ASSISTANT

## 2022-05-07 PROCEDURE — 80053 COMPREHEN METABOLIC PANEL: CPT | Performed by: PHYSICIAN ASSISTANT

## 2022-05-07 PROCEDURE — 94761 N-INVAS EAR/PLS OXIMETRY MLT: CPT

## 2022-05-07 PROCEDURE — 11000001 HC ACUTE MED/SURG PRIVATE ROOM

## 2022-05-07 PROCEDURE — 87340 HEPATITIS B SURFACE AG IA: CPT | Performed by: STUDENT IN AN ORGANIZED HEALTH CARE EDUCATION/TRAINING PROGRAM

## 2022-05-07 PROCEDURE — 86803 HEPATITIS C AB TEST: CPT | Performed by: STUDENT IN AN ORGANIZED HEALTH CARE EDUCATION/TRAINING PROGRAM

## 2022-05-07 PROCEDURE — 36415 COLL VENOUS BLD VENIPUNCTURE: CPT | Performed by: STUDENT IN AN ORGANIZED HEALTH CARE EDUCATION/TRAINING PROGRAM

## 2022-05-07 PROCEDURE — 86403 PARTICLE AGGLUT ANTBDY SCRN: CPT | Performed by: PHYSICIAN ASSISTANT

## 2022-05-07 PROCEDURE — 25000003 PHARM REV CODE 250: Performed by: EMERGENCY MEDICINE

## 2022-05-07 PROCEDURE — 87329 GIARDIA AG IA: CPT | Performed by: STUDENT IN AN ORGANIZED HEALTH CARE EDUCATION/TRAINING PROGRAM

## 2022-05-07 RX ADMIN — AMLODIPINE BESYLATE 10 MG: 5 TABLET ORAL at 08:05

## 2022-05-07 RX ADMIN — PANTOPRAZOLE SODIUM 40 MG: 40 TABLET, DELAYED RELEASE ORAL at 08:05

## 2022-05-07 RX ADMIN — BUPRENORPHINE 8 MG: 8 TABLET SUBLINGUAL at 12:05

## 2022-05-07 RX ADMIN — BUPRENORPHINE 8 MG: 8 TABLET SUBLINGUAL at 08:05

## 2022-05-07 RX ADMIN — SODIUM BICARBONATE: 84 INJECTION, SOLUTION INTRAVENOUS at 08:05

## 2022-05-07 RX ADMIN — SODIUM BICARBONATE: 84 INJECTION, SOLUTION INTRAVENOUS at 02:05

## 2022-05-07 RX ADMIN — BUPROPION HYDROCHLORIDE 150 MG: 150 TABLET, FILM COATED, EXTENDED RELEASE ORAL at 08:05

## 2022-05-07 RX ADMIN — BUPRENORPHINE 8 MG: 8 TABLET SUBLINGUAL at 01:05

## 2022-05-07 NOTE — PLAN OF CARE
Problem: Adult Inpatient Plan of Care  Goal: Absence of Hospital-Acquired Illness or Injury  Intervention: Identify and Manage Fall Risk  Flowsheets (Taken 5/7/2022 0249)  Safety Promotion/Fall Prevention: nonskid shoes/socks when out of bed  Goal: Optimal Comfort and Wellbeing  Intervention: Monitor Pain and Promote Comfort  Flowsheets (Taken 5/7/2022 0249)  Pain Management Interventions:   medication offered but refused   quiet environment facilitated   care clustered

## 2022-05-07 NOTE — PROGRESS NOTES
"Nephrology Progress Note    Hospital course:   Hospital f/u  Pt being followed for AFSHAN/ARF. Reports of long course diarrhea. Renal indices slowly improving with IVF.      Subjective:   Pt sitting on side of bed awake. States she is feeling much better today.      Objective:   /81   Pulse 71   Temp 98.2 °F (36.8 °C) (Oral)   Resp 18   Ht 5' 0.63" (1.54 m)   Wt 59 kg (130 lb 1.1 oz)   SpO2 97%   BMI 24.88 kg/m²     Intake/Output Summary (Last 24 hours) at 5/7/2022 0937  Last data filed at 5/7/2022 0600  Gross per 24 hour   Intake 1230 ml   Output 300 ml   Net 930 ml      Review of Systems   Constitutional: Positive for malaise/fatigue. Negative for chills and fever.   HENT: Negative.    Eyes: Negative.    Respiratory: Negative for cough and shortness of breath.    Cardiovascular: Positive for leg swelling. Negative for chest pain and palpitations.   Gastrointestinal: Negative.    Genitourinary: Negative for dysuria, flank pain, frequency and urgency.   Skin: Negative.        Physical exam: .  Physical Exam  Vitals and nursing note reviewed.   Constitutional:       General: She is not in acute distress.     Appearance: Normal appearance. She is obese. She is not ill-appearing.   HENT:      Head: Normocephalic and atraumatic.   Eyes:      Extraocular Movements: Extraocular movements intact.      Pupils: Pupils are equal, round, and reactive to light.   Cardiovascular:      Rate and Rhythm: Normal rate and regular rhythm.   Pulmonary:      Effort: Pulmonary effort is normal. No respiratory distress.      Breath sounds: Normal breath sounds.   Abdominal:      General: Bowel sounds are normal.      Palpations: Abdomen is soft.      Tenderness: There is no abdominal tenderness.   Musculoskeletal:         General: No swelling.      Cervical back: Normal range of motion and neck supple.   Skin:     General: Skin is warm and dry.   Neurological:      General: No focal deficit present.      Mental Status: She is " alert and oriented to person, place, and time. Mental status is at baseline.   Psychiatric:         Mood and Affect: Mood normal.         Behavior: Behavior normal.           Laboratory data:   Recent Results (from the past 24 hour(s))   C3 Complement    Collection Time: 05/06/22 10:09 AM   Result Value Ref Range    C3 Complement 133 80 - 173 mg/dL   C4 Complement    Collection Time: 05/06/22 10:09 AM   Result Value Ref Range    C4 Complement 23.3 13.0 - 46.0 mg/dL   Vitamin D    Collection Time: 05/06/22 10:09 AM   Result Value Ref Range    Vit D 25 OH 17.4 (L) 30.0 - 80.0 ng/mL   Iron and TIBC    Collection Time: 05/06/22 10:09 AM   Result Value Ref Range    Iron Binding Capacity Unsaturated 191 70 - 310 ug/dL    Iron Level 35 (L) 50 - 170 ug/dL    Iron Binding Capacity Total 226 (L) 250 - 450 ug/dL    Iron Saturation 261 (H) 20 - 50 %   Folate    Collection Time: 05/06/22 10:09 AM   Result Value Ref Range    Folate Level 11.3 7.0 - 31.4 ng/mL   Ferritin    Collection Time: 05/06/22 10:09 AM   Result Value Ref Range    Ferritin Level 187.38 4.63 - 204.00 ng/mL   C-Reactive Protein    Collection Time: 05/06/22 10:09 AM   Result Value Ref Range    C-Reactive Protein 2.20 (H) <=0.50 mg/L   PTH, Intact    Collection Time: 05/06/22 10:10 AM   Result Value Ref Range    Parathyroid Hormone Intact 384.4 (H) 8.7 - 77.0 pg/mL   POCT glucose    Collection Time: 05/06/22  9:53 PM   Result Value Ref Range    POCT Glucose 184 (H) 70 - 110 mg/dL   CBC with Differential    Collection Time: 05/07/22  3:49 AM   Result Value Ref Range    WBC 5.9 4.5 - 11.5 x10(3)/mcL    RBC 2.88 (L) 4.20 - 5.40 x10(6)/mcL    Hgb 8.7 (L) 12.0 - 16.0 gm/dL    Hct 25.9 (L) 37.0 - 47.0 %    MCV 89.9 80.0 - 94.0 fL    MCH 30.2 27.0 - 31.0 pg    MCHC 33.6 33.0 - 36.0 mg/dL    RDW 13.4 11.5 - 17.0 %    Platelet 218 130 - 400 x10(3)/mcL    MPV 10.8 9.4 - 12.4 fL    Neutro Auto 58.7 %    Lymph Auto 28.8 %    Mono Auto 7.6 %    Eos Auto 3.9 %    Basophil Auto  0.7 %    Abs Lymph 1.71 0.6 - 4.6 x10(3)/mcL    Abs Neutro 3.5 2.1 - 9.2 x10(3)/mcL    Abs Mono 0.45 0.1 - 1.3 x10(3)/mcL    Abs Eos 0.23 0 - 0.9 x10(3)/mcL    Abs Baso 0.04 0 - 0.2 x10(3)/mcL    IG# 0.02 (H) 0 - 0.0155 x10(3)/mcL    IG% 0.3 0 - 0.43 %    NRBC% 0.0 %   Comprehensive Metabolic Panel    Collection Time: 05/07/22  3:49 AM   Result Value Ref Range    Sodium Level 137 136 - 145 mmol/L    Potassium Level 4.5 3.5 - 5.1 mmol/L    Chloride 103 98 - 107 mmol/L    Carbon Dioxide 21 (L) 23 - 31 mmol/L    Glucose Level 232 (H) 82 - 115 mg/dL    Blood Urea Nitrogen 62.5 (H) 9.8 - 20.1 mg/dL    Creatinine 5.07 (H) 0.55 - 1.02 mg/dL    Calcium Level Total 8.1 (L) 8.4 - 10.2 mg/dL    Protein Total 6.4 5.8 - 7.6 gm/dL    Albumin Level 3.1 (L) 3.4 - 4.8 gm/dL    Globulin 3.3 2.4 - 3.5 gm/dL    Albumin/Globulin Ratio 0.9 (L) 1.1 - 2.0 ratio    Bilirubin Total 0.3 <=1.5 mg/dL    Bilirubin Direct 0.1 0.0 - 0.5 mg/dL    Bilirubin Indirect 0.20 0.00 - 0.80 mg/dL    Alkaline Phosphatase 88 40 - 150 unit/L    Alanine Aminotransferase 5 0 - 55 unit/L    Aspartate Aminotransferase 7 5 - 34 unit/L    Estimated GFR-Non  9 mls/min/1.73/m2       Imaging:   Imaging Results    None          Medications:     Current Facility-Administered Medications:     acetaminophen tablet 650 mg, 650 mg, Oral, Q4H PRN, Jelani Rushing, FNP    amLODIPine tablet 10 mg, 10 mg, Oral, Daily, Harish Hernandez MD, 10 mg at 05/07/22 0858    buprenorphine HCL SL tablet 8 mg, 8 mg, Sublingual, BID, Harish Hernandez MD, 8 mg at 05/07/22 0157    buPROPion TB24 tablet 150 mg, 150 mg, Oral, Daily, Harish Hernandez MD, 150 mg at 05/07/22 0859    cyclobenzaprine tablet 5 mg, 5 mg, Oral, TID PRN, Harish Hernandez MD    dextrose 10% bolus 125 mL, 12.5 g, Intravenous, PRN, Jelani Rushing, ORV[place]LE    diphenoxylate-atropine 2.5-0.025 mg per tablet 1 tablet, 1 tablet, Oral, Q6H PRN, Harish Hernandez MD    glucagon (human recombinant) injection 1 mg, 1 mg,  Intramuscular, PRN, Jelani Rushing, [place]    glucose chewable tablet 16 g, 16 g, Oral, PRN, Jelani Rushing, FNP    glucose chewable tablet 24 g, 24 g, Oral, PRN, Jelani Rushing, CHUCKP    HYDROcodone-acetaminophen 5-325 mg per tablet 1 tablet, 1 tablet, Oral, Q6H PRN, Jelani Rushing, CHUCKP    insulin aspart U-100 injection 0-5 Units, 0-5 Units, Subcutaneous, QID (AC + HS) PRN, ORVILLE Whitehead    naloxone 0.4 mg/mL injection 0.02 mg, 0.02 mg, Intravenous, PRN, ORVILLE Whitehead    ondansetron injection 4 mg, 4 mg, Intravenous, Q8H PRN, ORVILLE Whitehead    pantoprazole EC tablet 40 mg, 40 mg, Oral, Daily, Harish Hernandez MD, 40 mg at 05/07/22 0858    sodium bicarbonate 150 mEq in dextrose 5 % 1,000 mL infusion, , Intravenous, Continuous, Jamey Weiss MD, Last Rate: 75 mL/hr at 05/07/22 0209, New Bag at 05/07/22 0209    sodium chloride 0.9% flush 10 mL, 10 mL, Intravenous, Q12H PRN, ORVILLE Whitehead     Impression:   1. AFSHAN/ARF - Renal indices slowly improving with IVF. Urine output low compared to intake. Strict I & Os  2. History of long course diarrhea  3. Anemia of CKD - currently stable  4. Remote hx of hempatoma  5. DJD and plymalgia    Plan:  Cont IVF of D5 and bicarb @ 75ml/hr.   Labs in am

## 2022-05-07 NOTE — PROGRESS NOTES
[facility] Medicine Progress Note        Chief Complaint: Inpatient Follow-up for     HPI:   A 66 years old female with a history of hypertension chronic back pain on Neurontin Suboxone and smoking presented to the hospital because she was having movement issues.  She also admitted that she was having diarrhea for last 3-4 months however especially lately with decreased she is complaining of tremors for the last month she also has some slowing in walking.  She also complains of combination recently.   She states that she is making good amount of urine, does not have fever chills sweating nausea vomiting abdominal pain, nausea having chest pain shortness of breath lightheadedness dizziness.   In the hospital patient was found to have elevated creatinine and BUN she was admitted to the hospital.      Doing well, asymptomatic     Physical exam  Oriented  X3 no acute distress following commands properly  Cardiac regular rate and rhythm, no murmur or gallop  Respiratory clear bilaterally on room air non labored  Abdomen soft nontender non distended no rigidity or rebound.   Extremities no edema        VITAL SIGNS: 24 HRS MIN & MAX LAST   Temp  Min: 98 °F (36.7 °C)  Max: 98.2 °F (36.8 °C) 98.2 °F (36.8 °C)   BP  Min: 112/60  Max: 165/74 136/81   Pulse  Min: 66  Max: 90  71   Resp  Min: 16  Max: 18 18   SpO2  Min: 93 %  Max: 97 % 97 %       Recent Labs   Lab 05/05/22 2000 05/07/22  0349   WBC 8.5 5.9   RBC 3.39* 2.88*   HGB 10.2* 8.7*   HCT 33.1* 25.9*   MCV 97.6* 89.9   MCH 30.1 30.2   MCHC 30.8* 33.6   RDW 13.5 13.4   MPV 10.5 10.8       Recent Labs   Lab 05/05/22 2000 05/06/22  0453 05/07/22  0349   NA  --  139  --    CO2 16*  --  21*   BUN 74.3*  --  62.5*   CREATININE 6.59*  --  5.07*   CALCIUM 9.0  --  8.1*   ALBUMIN 3.8  --  3.1*   ALKPHOS 109  --  88   ALT 6  --  5   AST 13  --  7          Microbiology Results (last 7 days)     Procedure Component Value Units Date/Time     Urine culture [[id number]] Collected: 05/06/22 0927    Order Status: Sent Specimen: Urine Updated: 05/06/22 1210    Stool Culture [[id number]]     Order Status: Sent Specimen: Stool     Clostridium Diff Toxin, A & B, EIA [[id number]]     Order Status: Sent Specimen: Stool            MRI Brain Without Contrast  Narrative: EXAMINATION:  MRI BRAIN WITHOUT CONTRAST    CLINICAL HISTORY:  Parkinsonian syndrome;    TECHNIQUE:  Multiplanar MRI sequences were performed of the brain without contrast.    COMPARISON:  None available    FINDINGS:  Bilateral cerebral periventricular and subcortical white matter variable size T2-FLAIR hyperintense signals are consistent with mild chronic microangiopathic ischemia. Involutional changes contribute to cerebellar and cerebral cortical volume loss. Gradient echo sequences demonstrate no evidence of de phasing artifact to suggest hemorrhagic byproducts. No evidence of diffusion restriction or ADC map signal drop out to suggest acute infarct. The sella and suprasellar areas are unremarkable.    The cerebellar tonsils are normally positioned. There is no acute intracranial hemorrhage, hydrocephalus, midline shift or mass effect. No acute extra axial fluid collections identified. The mastoid air cells are clear.  Impression: 1.  No acute intracranial findings identified.    2.  Chronic microangiopathic ischemia and atrophy.    .    Electronically signed by: Diomedes Lopez  Date:    05/06/2022  Time:    17:30  US Retroperitoneal Complete  Narrative: EXAMINATION:  US RETROPERITONEAL COMPLETE    CLINICAL HISTORY:  Acute renal  failure;    TECHNIQUE:  Ultrasound evaluation of the kidneys, region of the ureters, and urinary bladder.    COMPARISON:  No relevant comparison studies available at the time of dictation.    FINDINGS:  No hydronephrosis. Overall renal echogenicity may be slightly increased.    Bladder has normal appearance with a calculated volume of 384 mL.    Imaged segments of the  abdominal aorta normal in caliber.    Measurements:    - Right kidney: 12.4 cm in length    - Left kidney: 11.9 cm in length  Impression: No hydronephrosis.  Possible medical renal disease.    Electronically signed by: Angel Hogn  Date:    05/06/2022  Time:    10:21  X-Ray Chest 1 View  Narrative: EXAMINATION:  XR CHEST 1 VIEW    CLINICAL HISTORY:  SOB;    TECHNIQUE:  Portable AP view of the chest.    COMPARISON:  Chest radiograph 01/08/2017.    FINDINGS:  The cardiomediastinal silhouette is stable in size and contour. Pulmonary vascularity is within normal limits. The lungs are well-inflated and are without focal consolidation, pleural effusion, or pneumothorax.    The imaged osseous structures and soft tissues are without acute abnormality.  Impression: No acute cardiopulmonary process.    Electronically signed by: Kvein Laird  Date:    05/06/2022  Time:    09:46      Scheduled Med:   amLODIPine  10 mg Oral Daily    buprenorphine HCL  8 mg Sublingual BID    buPROPion  150 mg Oral Daily    pantoprazole  40 mg Oral Daily        Continuous Infusions:   sodium bicarbonate drip 75 mL/hr at 05/07/22 0209        PRN Meds:  acetaminophen, cyclobenzaprine, dextrose 10%, diphenoxylate-atropine 2.5-0.025 mg, glucagon (human recombinant), glucose, glucose, HYDROcodone-acetaminophen, insulin aspart U-100, naloxone, ondansetron, sodium chloride 0.9%       Assessment/Plan:  Acute renal failure  Metabolic acidosis  Acute hypoxemic respiratory, improved and not requiring O2 supplementation  Hypomagnesemia  Macrocytic anemia  Tremors  Chronic diarrhea  Possible early dementia  History of hypertension  History of chronic back pain       Plan:   - creatinine are improving very slowly, acidosis improved.   - C diff stool culture ova and parasites, transglutaminase antibody to rule out celiac  -will consider consulting GI if these are negative due to chronic diarrhea.   - HIV, hepatitis-B and C  - MATT panel complements, urine  protein creatinine ratio  - renal ultrasound negative    - continue appropriate home medications  - MRI is negative for acute disease   - due to parkinsonian like symptoms if symptoms does not resolve, consider Neurology consult.        Harish Hernandez MD   05/07/2022

## 2022-05-08 LAB
ALBUMIN SERPL-MCNC: 3 GM/DL (ref 3.4–4.8)
ALBUMIN/GLOB SERPL: 1 RATIO (ref 1.1–2)
ALP SERPL-CCNC: 82 UNIT/L (ref 40–150)
ALT SERPL-CCNC: <5 UNIT/L (ref 0–55)
AST SERPL-CCNC: 8 UNIT/L (ref 5–34)
BASOPHILS # BLD AUTO: 0.04 X10(3)/MCL (ref 0–0.2)
BASOPHILS NFR BLD AUTO: 0.7 %
BILIRUBIN DIRECT+TOT PNL SERPL-MCNC: 0.1 MG/DL (ref 0–0.5)
BILIRUBIN DIRECT+TOT PNL SERPL-MCNC: 0.2 MG/DL (ref 0–0.8)
BILIRUBIN DIRECT+TOT PNL SERPL-MCNC: 0.3 MG/DL
BUN SERPL-MCNC: 54.2 MG/DL (ref 9.8–20.1)
CALCIUM SERPL-MCNC: 7.4 MG/DL (ref 8.4–10.2)
CHLORIDE SERPL-SCNC: 97 MMOL/L (ref 98–107)
CO2 SERPL-SCNC: 30 MMOL/L (ref 23–31)
CREAT SERPL-MCNC: 4.03 MG/DL (ref 0.55–1.02)
CRYPTOSP AG SPEC QL: NEGATIVE
EOSINOPHIL # BLD AUTO: 0.21 X10(3)/MCL (ref 0–0.9)
EOSINOPHIL NFR BLD AUTO: 3.6 %
ERYTHROCYTE [DISTWIDTH] IN BLOOD BY AUTOMATED COUNT: 13.6 % (ref 11.5–17)
FECAL LEUKOCYTE (OHS): POSITIVE
G LAMBLIA AG STL QL IA: NEGATIVE
GIARDIA/CRYPTO NEG CONT (OHS): NEGATIVE
GIARDIA/CRYPTO POS CONT (OHS): POSITIVE
GLOBULIN SER-MCNC: 3.1 GM/DL (ref 2.4–3.5)
GLUCOSE SERPL-MCNC: 127 MG/DL (ref 70–110)
GLUCOSE SERPL-MCNC: 153 MG/DL (ref 70–110)
GLUCOSE SERPL-MCNC: 170 MG/DL (ref 70–110)
GLUCOSE SERPL-MCNC: 301 MG/DL (ref 82–115)
HBV CORE IGM SERPL QL IA: NONREACTIVE
HBV SURFACE AG SERPL QL IA: NONREACTIVE
HCT VFR BLD AUTO: 25.4 % (ref 37–47)
HCV AB SERPL QL IA: NONREACTIVE
HGB BLD-MCNC: 8.2 GM/DL (ref 12–16)
IMM GRANULOCYTES # BLD AUTO: 0.02 X10(3)/MCL (ref 0–0.02)
IMM GRANULOCYTES NFR BLD AUTO: 0.3 % (ref 0–0.43)
LEUKO NEG CONT (OHS): NEGATIVE
LEUKO POS CONT (OHS): POSITIVE
LYMPHOCYTES # BLD AUTO: 2.02 X10(3)/MCL (ref 0.6–4.6)
LYMPHOCYTES NFR BLD AUTO: 34.5 %
MCH RBC QN AUTO: 30.1 PG (ref 27–31)
MCHC RBC AUTO-ENTMCNC: 32.3 MG/DL (ref 33–36)
MCV RBC AUTO: 93.4 FL (ref 80–94)
MONOCYTES # BLD AUTO: 0.43 X10(3)/MCL (ref 0.1–1.3)
MONOCYTES NFR BLD AUTO: 7.4 %
NEUTROPHILS # BLD AUTO: 3.1 X10(3)/MCL (ref 2.1–9.2)
NEUTROPHILS NFR BLD AUTO: 53.5 %
NRBC BLD AUTO-RTO: 0 %
PATH REV: NORMAL
PATHOLOGIST REVIEW: NORMAL
PLATELET # BLD AUTO: 200 X10(3)/MCL (ref 130–400)
PMV BLD AUTO: 10.1 FL (ref 9.4–12.4)
POCT GLUCOSE: 120 MG/DL (ref 70–110)
POCT GLUCOSE: 170 MG/DL (ref 70–110)
POTASSIUM SERPL-SCNC: 3.8 MMOL/L (ref 3.5–5.1)
PROT SERPL-MCNC: 6.1 GM/DL (ref 5.8–7.6)
RBC # BLD AUTO: 2.72 X10(6)/MCL (ref 4.2–5.4)
SODIUM SERPL-SCNC: 140 MMOL/L (ref 136–145)
WBC # SPEC AUTO: 5.9 X10(3)/MCL (ref 4.5–11.5)

## 2022-05-08 PROCEDURE — 36415 COLL VENOUS BLD VENIPUNCTURE: CPT | Performed by: STUDENT IN AN ORGANIZED HEALTH CARE EDUCATION/TRAINING PROGRAM

## 2022-05-08 PROCEDURE — 94761 N-INVAS EAR/PLS OXIMETRY MLT: CPT

## 2022-05-08 PROCEDURE — 63600175 PHARM REV CODE 636 W HCPCS: Performed by: STUDENT IN AN ORGANIZED HEALTH CARE EDUCATION/TRAINING PROGRAM

## 2022-05-08 PROCEDURE — 25000003 PHARM REV CODE 250: Performed by: STUDENT IN AN ORGANIZED HEALTH CARE EDUCATION/TRAINING PROGRAM

## 2022-05-08 PROCEDURE — 11000001 HC ACUTE MED/SURG PRIVATE ROOM

## 2022-05-08 PROCEDURE — 25000003 PHARM REV CODE 250: Performed by: EMERGENCY MEDICINE

## 2022-05-08 PROCEDURE — 85025 COMPLETE CBC W/AUTO DIFF WBC: CPT | Performed by: STUDENT IN AN ORGANIZED HEALTH CARE EDUCATION/TRAINING PROGRAM

## 2022-05-08 PROCEDURE — 80053 COMPREHEN METABOLIC PANEL: CPT | Performed by: NURSE PRACTITIONER

## 2022-05-08 RX ORDER — SODIUM CHLORIDE, SODIUM LACTATE, POTASSIUM CHLORIDE, CALCIUM CHLORIDE 600; 310; 30; 20 MG/100ML; MG/100ML; MG/100ML; MG/100ML
INJECTION, SOLUTION INTRAVENOUS CONTINUOUS
Status: DISCONTINUED | OUTPATIENT
Start: 2022-05-08 | End: 2022-05-10

## 2022-05-08 RX ADMIN — SODIUM BICARBONATE: 84 INJECTION, SOLUTION INTRAVENOUS at 01:05

## 2022-05-08 RX ADMIN — BUPRENORPHINE 8 MG: 8 TABLET SUBLINGUAL at 09:05

## 2022-05-08 RX ADMIN — PANTOPRAZOLE SODIUM 40 MG: 40 TABLET, DELAYED RELEASE ORAL at 08:05

## 2022-05-08 RX ADMIN — SODIUM CHLORIDE, POTASSIUM CHLORIDE, SODIUM LACTATE AND CALCIUM CHLORIDE: 600; 310; 30; 20 INJECTION, SOLUTION INTRAVENOUS at 02:05

## 2022-05-08 RX ADMIN — AMLODIPINE BESYLATE 10 MG: 5 TABLET ORAL at 08:05

## 2022-05-08 RX ADMIN — BUPRENORPHINE 8 MG: 8 TABLET SUBLINGUAL at 11:05

## 2022-05-08 NOTE — PROGRESS NOTES
"Nephrology Progress Note    Hospital course:   Hospital f/u.  WE are following pt for AFSHAN/ARF. Renal indices improving with IVF    Subjective:   Pt sitting up on side of bed. States feeling well today. NAD noted    Objective:   /78   Pulse 77   Temp 98.2 °F (36.8 °C) (Oral)   Resp 18   Ht 5' 0.63" (1.54 m)   Wt 59 kg (130 lb 1.1 oz)   SpO2 (!) 94%   BMI 24.88 kg/m²     Intake/Output Summary (Last 24 hours) at 5/8/2022 1001  Last data filed at 5/8/2022 0700  Gross per 24 hour   Intake 600 ml   Output --   Net 600 ml      Review of Systems   Constitutional: Positive for malaise/fatigue.   Respiratory: Negative for cough and shortness of breath.    Cardiovascular: Negative for chest pain, palpitations and leg swelling.   Neurological: Positive for weakness.       Physical Exam  Constitutional:       General: She is not in acute distress.     Appearance: Normal appearance.   HENT:      Head: Normocephalic and atraumatic.      Mouth/Throat:      Mouth: Mucous membranes are dry.   Eyes:      Extraocular Movements: Extraocular movements intact.      Conjunctiva/sclera: Conjunctivae normal.      Pupils: Pupils are equal, round, and reactive to light.   Cardiovascular:      Rate and Rhythm: Normal rate and regular rhythm.      Pulses: Normal pulses.      Heart sounds: Normal heart sounds.   Pulmonary:      Effort: Pulmonary effort is normal. No respiratory distress.      Breath sounds: Normal breath sounds.   Abdominal:      General: Abdomen is flat. Bowel sounds are normal.      Palpations: Abdomen is soft.   Musculoskeletal:         General: Normal range of motion.      Cervical back: Normal range of motion.   Skin:     General: Skin is warm and dry.   Neurological:      General: No focal deficit present.      Mental Status: She is alert and oriented to person, place, and time.   Psychiatric:         Mood and Affect: Mood normal.         Behavior: Behavior normal.         Thought Content: Thought content " normal.         Judgment: Judgment normal.           Laboratory data:   Recent Results (from the past 24 hour(s))   Hepatitis B Panel    Collection Time: 05/07/22  5:03 PM   Result Value Ref Range    Hepatitis B Core IgM Nonreactive Nonreactive    Hepatitis B Surface Antigen Nonreactive Nonreactive    PATHOLOGIST REVIEW     Hepatitis C Antibody    Collection Time: 05/07/22  5:03 PM   Result Value Ref Range    Hepatitis C Antibody Nonreactive Nonreactive   Pathologist Interpretation    Collection Time: 05/07/22  5:03 PM   Result Value Ref Range    Pathology Review N/A    POCT Glucose, Hand-Held Device    Collection Time: 05/08/22  2:28 AM   Result Value Ref Range    POC Glucose 153 (A) 70 - 110 MG/DL   Comprehensive Metabolic Panel    Collection Time: 05/08/22  3:43 AM   Result Value Ref Range    Sodium Level 140 136 - 145 mmol/L    Potassium Level 3.8 3.5 - 5.1 mmol/L    Chloride 97 (L) 98 - 107 mmol/L    Carbon Dioxide 30 23 - 31 mmol/L    Glucose Level 301 (H) 82 - 115 mg/dL    Blood Urea Nitrogen 54.2 (H) 9.8 - 20.1 mg/dL    Creatinine 4.03 (H) 0.55 - 1.02 mg/dL    Calcium Level Total 7.4 (L) 8.4 - 10.2 mg/dL    Protein Total 6.1 5.8 - 7.6 gm/dL    Albumin Level 3.0 (L) 3.4 - 4.8 gm/dL    Globulin 3.1 2.4 - 3.5 gm/dL    Albumin/Globulin Ratio 1.0 (L) 1.1 - 2.0 ratio    Bilirubin Total 0.3 <=1.5 mg/dL    Bilirubin Direct 0.1 0.0 - 0.5 mg/dL    Bilirubin Indirect 0.20 0.00 - 0.80 mg/dL    Alkaline Phosphatase 82 40 - 150 unit/L    Alanine Aminotransferase <5 0 - 55 unit/L    Aspartate Aminotransferase 8 5 - 34 unit/L    Estimated GFR-Non  12 mls/min/1.73/m2   CBC with Differential    Collection Time: 05/08/22  3:43 AM   Result Value Ref Range    WBC 5.9 4.5 - 11.5 x10(3)/mcL    RBC 2.72 (L) 4.20 - 5.40 x10(6)/mcL    Hgb 8.2 (L) 12.0 - 16.0 gm/dL    Hct 25.4 (L) 37.0 - 47.0 %    MCV 93.4 80.0 - 94.0 fL    MCH 30.1 27.0 - 31.0 pg    MCHC 32.3 (L) 33.0 - 36.0 mg/dL    RDW 13.6 11.5 - 17.0 %    Platelet  200 130 - 400 x10(3)/mcL    MPV 10.1 9.4 - 12.4 fL    Neutro Auto 53.5 %    Lymph Auto 34.5 %    Mono Auto 7.4 %    Eos Auto 3.6 %    Basophil Auto 0.7 %    Abs Lymph 2.02 0.6 - 4.6 x10(3)/mcL    Abs Neutro 3.1 2.1 - 9.2 x10(3)/mcL    Abs Mono 0.43 0.1 - 1.3 x10(3)/mcL    Abs Eos 0.21 0 - 0.9 x10(3)/mcL    Abs Baso 0.04 0 - 0.2 x10(3)/mcL    IG# 0.02 (H) 0 - 0.0155 x10(3)/mcL    IG% 0.3 0 - 0.43 %    NRBC% 0.0 %   POCT Glucose, Hand-Held Device    Collection Time: 05/08/22  6:29 AM   Result Value Ref Range    POC Glucose 170 (A) 70 - 110 MG/DL       Medications:     Current Facility-Administered Medications:     acetaminophen tablet 650 mg, 650 mg, Oral, Q4H PRN, ORVILLE Whitehead    amLODIPine tablet 10 mg, 10 mg, Oral, Daily, Harish Hernandez MD, 10 mg at 05/08/22 0802    buprenorphine HCL SL tablet 8 mg, 8 mg, Sublingual, BID, Harish Hernandez MD, 8 mg at 05/07/22 2010    buPROPion TB24 tablet 150 mg, 150 mg, Oral, Daily, Harish Hernandez MD, 150 mg at 05/07/22 0859    cyclobenzaprine tablet 5 mg, 5 mg, Oral, TID PRN, Harish Hernandez MD    dextrose 10% bolus 125 mL, 12.5 g, Intravenous, PRN, ORVILLE Whitehead    diphenoxylate-atropine 2.5-0.025 mg per tablet 1 tablet, 1 tablet, Oral, Q6H PRN, Harish Hernandez MD    glucagon (human recombinant) injection 1 mg, 1 mg, Intramuscular, PRN, ORV[place]LE Whitehead    glucose chewable tablet 16 g, 16 g, Oral, PRN, ORV[place]LE Whitehead    glucose chewable tablet 24 g, 24 g, Oral, PRN, ORV[place]LE Whitehead    HYDROcodone-acetaminophen 5-325 mg per tablet 1 tablet, 1 tablet, Oral, Q6H PRN, ORV[place]LE Whitehead    insulin aspart U-100 injection 0-5 Units, 0-5 Units, Subcutaneous, QID (AC + HS) PRN, ORVILLE Whitehead    naloxone 0.4 mg/mL injection 0.02 mg, 0.02 mg, Intravenous, PRN, ORV[place]LE Whitehead    ondansetron injection 4 mg, 4 mg, Intravenous, Q8H PRN, ORV[place]LE Whitehead    pantoprazole EC tablet 40 mg, 40 mg,  Oral, Daily, Harish Hernandez MD, 40 mg at 05/08/22 0802    sodium bicarbonate 150 mEq in dextrose 5 % 1,000 mL infusion, , Intravenous, Continuous, Jamey Weiss MD, Last Rate: 75 mL/hr at 05/07/22 2010, New Bag at 05/07/22 2010    sodium chloride 0.9% flush 10 mL, 10 mL, Intravenous, Q12H PRN, Jelani Rushing, CHUCKP        Impression:   1. AFSHAN/ARF - Renal indices improving with IVF. Urine output not recorded.   2. History of long course diarrhea  3. Anemia of CKD - currently stable  4. Remote hx of hempatoma  5. DJD and plymalgia     Plan:  Cont IVF of D5 and bicarb @ 75ml/hr.   Labs in am  Strict I & Os

## 2022-05-08 NOTE — PROGRESS NOTES
[facility] Medicine Progress Note        Chief Complaint: Inpatient Follow-up for     HPI:   A 66 years old female with a history of hypertension chronic back pain on Neurontin Suboxone and smoking presented to the hospital because she was having movement issues.  She also admitted that she was having diarrhea for last 3-4 months however especially lately with decreased she is complaining of tremors for the last month she also has some slowing in walking.  She also complains of combination recently.   She states that she is making good amount of urine, does not have fever chills sweating nausea vomiting abdominal pain, nausea having chest pain shortness of breath lightheadedness dizziness.   In the hospital patient was found to have elevated creatinine and BUN she was admitted to the hospital.      Doing well, asymptomatic, improving Cr, NAD     Physical exam  Oriented  X3 no acute distress following commands properly  Cardiac regular rate and rhythm, no murmur or gallop  Respiratory clear bilaterally on room air non labored  Abdomen soft nontender non distended no rigidity or rebound.   Extremities no edema        VITAL SIGNS: 24 HRS MIN & MAX LAST   Temp  Min: 98.2 °F (36.8 °C)  Max: 98.7 °F (37.1 °C) 98.3 °F (36.8 °C)   BP  Min: 116/72  Max: 158/79 (!) 143/84   Pulse  Min: 75  Max: 88  88   Resp  Min: 16  Max: 18 18   SpO2  Min: 94 %  Max: 96 % 95 %       Recent Labs   Lab 05/05/22 2000 05/07/22 0349 05/08/22  0343   WBC 8.5 5.9 5.9   RBC 3.39* 2.88* 2.72*   HGB 10.2* 8.7* 8.2*   HCT 33.1* 25.9* 25.4*   MCV 97.6* 89.9 93.4   MCH 30.1 30.2 30.1   MCHC 30.8* 33.6 32.3*   RDW 13.5 13.4 13.6   MPV 10.5 10.8 10.1       Recent Labs   Lab 05/05/22 2000 05/06/22 0453 05/07/22 0349 05/08/22 0343   NA  --  139  --   --    CO2 16*  --  21* 30   BUN 74.3*  --  62.5* 54.2*   CREATININE 6.59*  --  5.07* 4.03*   CALCIUM 9.0  --  8.1* 7.4*   ALBUMIN 3.8  --  3.1* 3.0*   ALKPHOS 109   --  88 82   ALT 6  --  5 <5   AST 13  --  7 8          Microbiology Results (last 7 days)     Procedure Component Value Units Date/Time    Stool Culture [[id number]]  (Normal) Collected: 05/07/22 1220    Order Status: Completed Specimen: Stool Updated: 05/08/22 1038     Stool Culture Negative for Salmonella, Shigella, Campylobacter, Vibrio, Aeromonas, Pleisiomonas,Yersinia, or Shiga Toxin 1 and 2.    Clostridium Diff Toxin, A & B, EIA [[id number]] Collected: 05/07/22 1220    Order Status: Canceled Specimen: Stool Updated: 05/07/22 1236    Urine culture [[id number]] Collected: 05/06/22 0927    Order Status: Sent Specimen: Urine Updated: 05/06/22 1210           MRI Brain Without Contrast  Narrative: EXAMINATION:  MRI BRAIN WITHOUT CONTRAST    CLINICAL HISTORY:  Parkinsonian syndrome;    TECHNIQUE:  Multiplanar MRI sequences were performed of the brain without contrast.    COMPARISON:  None available    FINDINGS:  Bilateral cerebral periventricular and subcortical white matter variable size T2-FLAIR hyperintense signals are consistent with mild chronic microangiopathic ischemia. Involutional changes contribute to cerebellar and cerebral cortical volume loss. Gradient echo sequences demonstrate no evidence of de phasing artifact to suggest hemorrhagic byproducts. No evidence of diffusion restriction or ADC map signal drop out to suggest acute infarct. The sella and suprasellar areas are unremarkable.    The cerebellar tonsils are normally positioned. There is no acute intracranial hemorrhage, hydrocephalus, midline shift or mass effect. No acute extra axial fluid collections identified. The mastoid air cells are clear.  Impression: 1.  No acute intracranial findings identified.    2.  Chronic microangiopathic ischemia and atrophy.    .    Electronically signed by: Diomedes Lopez  Date:    05/06/2022  Time:    17:30  US Retroperitoneal Complete  Narrative: EXAMINATION:  US RETROPERITONEAL COMPLETE    CLINICAL HISTORY:  Acute  renal  failure;    TECHNIQUE:  Ultrasound evaluation of the kidneys, region of the ureters, and urinary bladder.    COMPARISON:  No relevant comparison studies available at the time of dictation.    FINDINGS:  No hydronephrosis. Overall renal echogenicity may be slightly increased.    Bladder has normal appearance with a calculated volume of 384 mL.    Imaged segments of the abdominal aorta normal in caliber.    Measurements:    - Right kidney: 12.4 cm in length    - Left kidney: 11.9 cm in length  Impression: No hydronephrosis.  Possible medical renal disease.    Electronically signed by: Angel Hong  Date:    05/06/2022  Time:    10:21  X-Ray Chest 1 View  Narrative: EXAMINATION:  XR CHEST 1 VIEW    CLINICAL HISTORY:  SOB;    TECHNIQUE:  Portable AP view of the chest.    COMPARISON:  Chest radiograph 01/08/2017.    FINDINGS:  The cardiomediastinal silhouette is stable in size and contour. Pulmonary vascularity is within normal limits. The lungs are well-inflated and are without focal consolidation, pleural effusion, or pneumothorax.    The imaged osseous structures and soft tissues are without acute abnormality.  Impression: No acute cardiopulmonary process.    Electronically signed by: Kevin Laird  Date:    05/06/2022  Time:    09:46      Scheduled Med:   amLODIPine  10 mg Oral Daily    buprenorphine HCL  8 mg Sublingual BID    buPROPion  150 mg Oral Daily    pantoprazole  40 mg Oral Daily        Continuous Infusions:   lactated ringers          PRN Meds:  acetaminophen, cyclobenzaprine, dextrose 10%, diphenoxylate-atropine 2.5-0.025 mg, glucagon (human recombinant), glucose, glucose, HYDROcodone-acetaminophen, insulin aspart U-100, naloxone, ondansetron, sodium chloride 0.9%       Assessment/Plan:  Acute renal failure  Metabolic acidosis  Acute hypoxemic respiratory, improved and not requiring O2 supplementation  Hypomagnesemia  Macrocytic anemia  Tremors  Chronic diarrhea  Possible early dementia  History  of hypertension  History of chronic back pain       Plan:   Pending labs.   change fluids to 100 cc LR   - C diff stool culture ova and parasites, transglutaminase antibody to rule out celiac  -will consider consulting GI if these are negative due to chronic diarrhea.   - HIV, hepatitis-B and C  - MATT panel complements, urine protein creatinine ratio  - renal ultrasound negative    - continue appropriate home medications  - MRI is negative for acute disease   - due to parkinsonian like symptoms if symptoms does not resolve, consider Neurology consult.        Harish Hernandez MD   05/08/2022

## 2022-05-08 NOTE — PLAN OF CARE
Problem: Adult Inpatient Plan of Care  Goal: Plan of Care Review  5/8/2022 0538 by Arjun Valente RN  Outcome: Ongoing, Progressing  5/8/2022 0538 by Arjun Valente RN  Outcome: Ongoing, Progressing  Goal: Patient-Specific Goal (Individualized)  5/8/2022 0538 by Arjun Valente RN  Outcome: Ongoing, Progressing  5/8/2022 0538 by Arjun Valente RN  Outcome: Ongoing, Progressing  Goal: Absence of Hospital-Acquired Illness or Injury  5/8/2022 0538 by Arjun Valente RN  Outcome: Ongoing, Progressing  5/8/2022 0538 by Arjun Valente RN  Outcome: Ongoing, Progressing  Goal: Optimal Comfort and Wellbeing  5/8/2022 0538 by Arjun Valente RN  Outcome: Ongoing, Progressing  5/8/2022 0538 by Arjun Valente RN  Outcome: Ongoing, Progressing  Goal: Readiness for Transition of Care  5/8/2022 0538 by Arjun Valente RN  Outcome: Ongoing, Progressing  5/8/2022 0538 by Arjun Valente RN  Outcome: Ongoing, Progressing     Problem: Fluid and Electrolyte Imbalance (Acute Kidney Injury/Impairment)  Goal: Fluid and Electrolyte Balance  5/8/2022 0538 by Arjun Valente RN  Outcome: Ongoing, Progressing  5/8/2022 0538 by Arjun Valente RN  Outcome: Ongoing, Progressing     Problem: Oral Intake Inadequate (Acute Kidney Injury/Impairment)  Goal: Optimal Nutrition Intake  5/8/2022 0538 by Arjun Valente RN  Outcome: Ongoing, Progressing  5/8/2022 0538 by Arjun Valente RN  Outcome: Ongoing, Progressing     Problem: Renal Function Impairment (Acute Kidney Injury/Impairment)  Goal: Effective Renal Function  5/8/2022 0538 by Arjun Valente RN  Outcome: Ongoing, Progressing  5/8/2022 0538 by Arjun Valente RN  Outcome: Ongoing, Progressing

## 2022-05-08 NOTE — PLAN OF CARE
Problem: Adult Inpatient Plan of Care  Goal: Plan of Care Review  5/8/2022 0541 by Arjun Valente RN  Outcome: Ongoing, Progressing  5/8/2022 0538 by Arjun Valente RN  Outcome: Ongoing, Progressing  5/8/2022 0538 by Arjun Valente RN  Outcome: Ongoing, Progressing  Goal: Patient-Specific Goal (Individualized)  5/8/2022 0541 by Arjun Valente RN  Outcome: Ongoing, Progressing  5/8/2022 0538 by Arjun Valente RN  Outcome: Ongoing, Progressing  5/8/2022 0538 by Arjun Valente RN  Outcome: Ongoing, Progressing  Goal: Absence of Hospital-Acquired Illness or Injury  5/8/2022 0541 by Arjun Valente RN  Outcome: Ongoing, Progressing  5/8/2022 0538 by Arjun Valente RN  Outcome: Ongoing, Progressing  5/8/2022 0538 by Arjun Valente RN  Outcome: Ongoing, Progressing  Goal: Optimal Comfort and Wellbeing  5/8/2022 0541 by Arjun Valente RN  Outcome: Ongoing, Progressing  5/8/2022 0538 by Arjun Valente RN  Outcome: Ongoing, Progressing  5/8/2022 0538 by Arjun Valente RN  Outcome: Ongoing, Progressing  Goal: Readiness for Transition of Care  5/8/2022 0541 by Arjun Valente RN  Outcome: Ongoing, Progressing  5/8/2022 0538 by Arjun Valente RN  Outcome: Ongoing, Progressing  5/8/2022 0538 by Arjun Valente RN  Outcome: Ongoing, Progressing     Problem: Fluid and Electrolyte Imbalance (Acute Kidney Injury/Impairment)  Goal: Fluid and Electrolyte Balance  5/8/2022 0541 by Arjun Valente RN  Outcome: Ongoing, Progressing  5/8/2022 0538 by Arjun Valente RN  Outcome: Ongoing, Progressing  5/8/2022 0538 by Arjun Valente RN  Outcome: Ongoing, Progressing     Problem: Oral Intake Inadequate (Acute Kidney Injury/Impairment)  Goal: Optimal Nutrition Intake  5/8/2022 0541 by Arjun Valente RN  Outcome: Ongoing, Progressing  5/8/2022 0538 by Arjun Valente RN  Outcome: Ongoing, Progressing  5/8/2022 0538 by Arjun Valente RN  Outcome: Ongoing, Progressing     Problem: Renal Function Impairment (Acute Kidney Injury/Impairment)  Goal: Effective Renal Function  5/8/2022 0541 by  Arjun Valente RN  Outcome: Ongoing, Progressing  5/8/2022 0538 by Arjun Valente RN  Outcome: Ongoing, Progressing  5/8/2022 0538 by Arjun Valente RN  Outcome: Ongoing, Progressing

## 2022-05-09 LAB
ADENOVIRUS F 40/41 (PREMIER): NOT DETECTED
ALBUMIN SERPL-MCNC: 3.2 GM/DL (ref 3.4–4.8)
ALBUMIN/GLOB SERPL: 1 RATIO (ref 1.1–2)
ALP SERPL-CCNC: 82 UNIT/L (ref 40–150)
ALT SERPL-CCNC: <5 UNIT/L (ref 0–55)
ANCA AB SER QL IF: NEGATIVE
AST SERPL-CCNC: 8 UNIT/L (ref 5–34)
ASTROVIRUS (PREMIER): NOT DETECTED
BACTERIA SPEC CULT: NORMAL
BASOPHILS # BLD AUTO: 0.04 X10(3)/MCL (ref 0–0.2)
BASOPHILS NFR BLD AUTO: 0.6 %
BILIRUBIN DIRECT+TOT PNL SERPL-MCNC: 0.1 MG/DL (ref 0–0.5)
BILIRUBIN DIRECT+TOT PNL SERPL-MCNC: 0.2 MG/DL (ref 0–0.8)
BILIRUBIN DIRECT+TOT PNL SERPL-MCNC: 0.3 MG/DL
BUN SERPL-MCNC: 42.3 MG/DL (ref 9.8–20.1)
CALCIUM SERPL-MCNC: 7.6 MG/DL (ref 8.4–10.2)
CAMPYLOBACTER (PREMIER): NOT DETECTED
CHLORIDE SERPL-SCNC: 99 MMOL/L (ref 98–107)
CLOSTRIDIUM DIFFICILE TOXIN A/B (PREMIER): NOT DETECTED
CO2 SERPL-SCNC: 27 MMOL/L (ref 23–31)
CREAT SERPL-MCNC: 3.05 MG/DL (ref 0.55–1.02)
CREAT UR-MCNC: 34.4 MG/DL (ref 47–110)
CRYPTOSPORIDIUM (PREMIER): NOT DETECTED
CYCLOSPORA CAYETANENSIS (PREMIER): NOT DETECTED
ENTAMOEBA HISTOLYTICA (PREMIER): NOT DETECTED
ENTEROAGGREGATIVE E. COLI (EAEC) (PREMIER): NOT DETECTED
ENTEROPATHOGENIC E. COLI (EPEC) (PREMIER): NOT DETECTED
ENTEROTOXIGENIC E. COLI (ETEC) LT/ST (PREMIER): NOT DETECTED
EOSINOPHIL # BLD AUTO: 0.25 X10(3)/MCL (ref 0–0.9)
EOSINOPHIL NFR BLD AUTO: 3.9 %
ERYTHROCYTE [DISTWIDTH] IN BLOOD BY AUTOMATED COUNT: 13.2 % (ref 11.5–17)
ERYTHROCYTE [SEDIMENTATION RATE] IN BLOOD: 97 MM/HR (ref 0–20)
FERRITIN SERPL-MCNC: 175.44 NG/ML (ref 4.63–204)
GIARDIA LAMBLIA (PREMIER): NOT DETECTED
GLOBULIN SER-MCNC: 3.3 GM/DL (ref 2.4–3.5)
GLUCOSE SERPL-MCNC: 215 MG/DL (ref 82–115)
HCT VFR BLD AUTO: 27.4 % (ref 37–47)
HGB BLD-MCNC: 8.6 GM/DL (ref 12–16)
IMM GRANULOCYTES # BLD AUTO: 0.02 X10(3)/MCL (ref 0–0.02)
IMM GRANULOCYTES NFR BLD AUTO: 0.3 % (ref 0–0.43)
IRON SATN MFR SERPL: 15 % (ref 20–50)
IRON SATN MFR SERPL: 9 % (ref 20–50)
IRON SERPL-MCNC: 19 UG/DL (ref 50–170)
IRON SERPL-MCNC: 19 UG/DL (ref 50–170)
IRON SERPL-MCNC: 35 UG/DL (ref 50–170)
LYMPHOCYTES # BLD AUTO: 2.07 X10(3)/MCL (ref 0.6–4.6)
LYMPHOCYTES NFR BLD AUTO: 32 %
MCH RBC QN AUTO: 29.4 PG (ref 27–31)
MCHC RBC AUTO-ENTMCNC: 31.4 MG/DL (ref 33–36)
MCV RBC AUTO: 93.5 FL (ref 80–94)
MONOCYTES # BLD AUTO: 0.65 X10(3)/MCL (ref 0.1–1.3)
MONOCYTES NFR BLD AUTO: 10 %
NEUTROPHILS # BLD AUTO: 3.4 X10(3)/MCL (ref 2.1–9.2)
NEUTROPHILS NFR BLD AUTO: 53.2 %
NOROVIRUS GI/GII (PREMIER): NOT DETECTED
NRBC BLD AUTO-RTO: 0 %
P-ANCA SER QL IF: NEGATIVE
PLATELET # BLD AUTO: 219 X10(3)/MCL (ref 130–400)
PLESIOMONAS SHIGELLOIDES (PREMIER): NOT DETECTED
PMV BLD AUTO: 10.4 FL (ref 9.4–12.4)
POCT GLUCOSE: 127 MG/DL (ref 70–110)
POCT GLUCOSE: 142 MG/DL (ref 70–110)
POCT GLUCOSE: 153 MG/DL (ref 70–110)
POCT GLUCOSE: 157 MG/DL (ref 70–110)
POCT GLUCOSE: 159 MG/DL (ref 70–110)
POCT GLUCOSE: 182 MG/DL (ref 70–110)
POCT GLUCOSE: 205 MG/DL (ref 70–110)
POTASSIUM SERPL-SCNC: 4 MMOL/L (ref 3.5–5.1)
PROT SERPL-MCNC: 6.5 GM/DL (ref 5.8–7.6)
PROT UR STRIP-MCNC: 9.5 MG/DL
PROT/CREAT UR-RTO: 276.2 MG/GM CR
RBC # BLD AUTO: 2.93 X10(6)/MCL (ref 4.2–5.4)
ROTAVIRUS A (PREMIER): NOT DETECTED
SALMONELLA (PREMIER): NOT DETECTED
SAPOVIRUS (PREMIER): NOT DETECTED
SHIGA-LIKE TOXIN-PRODUCING E. COLI (STEC) STX1/STX2 (PREMIER): NOT DETECTED
SHIGELLA/ENTERINVASIVE E. COLI (EIEC) (PREMIER): NOT DETECTED
SODIUM SERPL-SCNC: 140 MMOL/L (ref 136–145)
TIBC SERPL-MCNC: 183 UG/DL (ref 70–310)
TIBC SERPL-MCNC: 191 UG/DL (ref 70–310)
TIBC SERPL-MCNC: 202 UG/DL (ref 250–450)
TIBC SERPL-MCNC: 226 UG/DL (ref 250–450)
TRANSFERRIN SERPL-MCNC: 195 MG/DL (ref 173–360)
TRANSFERRIN SERPL-MCNC: 195 MG/DL (ref 173–360)
VIBRIO (PREMIER): NOT DETECTED
VIBRIO CHOLERA (PREMIER): NOT DETECTED
WBC # SPEC AUTO: 6.5 X10(3)/MCL (ref 4.5–11.5)
YERSINIA ENTEROCOLITICA (PREMIER): NOT DETECTED

## 2022-05-09 PROCEDURE — 25000003 PHARM REV CODE 250: Performed by: STUDENT IN AN ORGANIZED HEALTH CARE EDUCATION/TRAINING PROGRAM

## 2022-05-09 PROCEDURE — 84466 ASSAY OF TRANSFERRIN: CPT | Performed by: STUDENT IN AN ORGANIZED HEALTH CARE EDUCATION/TRAINING PROGRAM

## 2022-05-09 PROCEDURE — 83540 ASSAY OF IRON: CPT | Performed by: STUDENT IN AN ORGANIZED HEALTH CARE EDUCATION/TRAINING PROGRAM

## 2022-05-09 PROCEDURE — 82570 ASSAY OF URINE CREATININE: CPT | Performed by: INTERNAL MEDICINE

## 2022-05-09 PROCEDURE — 85025 COMPLETE CBC W/AUTO DIFF WBC: CPT | Performed by: STUDENT IN AN ORGANIZED HEALTH CARE EDUCATION/TRAINING PROGRAM

## 2022-05-09 PROCEDURE — 36415 COLL VENOUS BLD VENIPUNCTURE: CPT | Performed by: STUDENT IN AN ORGANIZED HEALTH CARE EDUCATION/TRAINING PROGRAM

## 2022-05-09 PROCEDURE — 25000003 PHARM REV CODE 250: Performed by: INTERNAL MEDICINE

## 2022-05-09 PROCEDURE — 11000001 HC ACUTE MED/SURG PRIVATE ROOM

## 2022-05-09 PROCEDURE — 82728 ASSAY OF FERRITIN: CPT | Performed by: STUDENT IN AN ORGANIZED HEALTH CARE EDUCATION/TRAINING PROGRAM

## 2022-05-09 PROCEDURE — 80053 COMPREHEN METABOLIC PANEL: CPT | Performed by: NURSE PRACTITIONER

## 2022-05-09 PROCEDURE — 85651 RBC SED RATE NONAUTOMATED: CPT | Performed by: INTERNAL MEDICINE

## 2022-05-09 PROCEDURE — 36415 COLL VENOUS BLD VENIPUNCTURE: CPT | Performed by: INTERNAL MEDICINE

## 2022-05-09 RX ORDER — CHOLECALCIFEROL (VITAMIN D3) 25 MCG
5000 TABLET ORAL DAILY
Status: DISCONTINUED | OUTPATIENT
Start: 2022-05-09 | End: 2022-05-10 | Stop reason: HOSPADM

## 2022-05-09 RX ADMIN — BUPRENORPHINE 8 MG: 8 TABLET SUBLINGUAL at 09:05

## 2022-05-09 RX ADMIN — BUPRENORPHINE 8 MG: 8 TABLET SUBLINGUAL at 08:05

## 2022-05-09 RX ADMIN — PANTOPRAZOLE SODIUM 40 MG: 40 TABLET, DELAYED RELEASE ORAL at 08:05

## 2022-05-09 RX ADMIN — AMLODIPINE BESYLATE 10 MG: 5 TABLET ORAL at 08:05

## 2022-05-09 RX ADMIN — Medication 5000 UNITS: at 02:05

## 2022-05-09 NOTE — PROGRESS NOTES
[facility] Hospital Medicine Progress Note        Chief Complaint: Inpatient Follow-up for     HPI:   A 66 years old female with a history of hypertension chronic back pain on Neurontin Suboxone and smoking presented to the hospital because she was having movement issues.  She also admitted that she was having diarrhea for last 3-4 months however especially lately with decreased she is complaining of tremors for the last month she also has some slowing in walking.  She also complains of combination recently.   She states that she is making good amount of urine, does not have fever chills sweating nausea vomiting abdominal pain, nausea having chest pain shortness of breath lightheadedness dizziness.   In the hospital patient was found to have elevated creatinine and BUN she was admitted to the hospital.      Seen and examined patient she denies having fever chills sweating nausea vomiting eating drinking well.   Creatinine improving.     Physical exam  Oriented  X3 no acute distress following commands properly  Cardiac regular rate and rhythm, no murmur or gallop  Respiratory clear bilaterally on room air non labored  Abdomen soft nontender non distended no rigidity or rebound.   Extremities no edema        VITAL SIGNS: 24 HRS MIN & MAX LAST   Temp  Min: 98.3 °F (36.8 °C)  Max: 98.8 °F (37.1 °C) 98.8 °F (37.1 °C)   BP  Min: 115/74  Max: 151/83 127/78   Pulse  Min: 72  Max: 88  72   Resp  Min: 18  Max: 18 18   SpO2  Min: 94 %  Max: 96 % 95 %       Recent Labs   Lab 05/07/22  0349 05/08/22  0343 05/09/22  0426   WBC 5.9 5.9 6.5   RBC 2.88* 2.72* 2.93*   HGB 8.7* 8.2* 8.6*   HCT 25.9* 25.4* 27.4*   MCV 89.9 93.4 93.5   MCH 30.2 30.1 29.4   MCHC 33.6 32.3* 31.4*   RDW 13.4 13.6 13.2   MPV 10.8 10.1 10.4       Recent Labs   Lab 05/06/22  0453 05/07/22  0349 05/08/22  0343 05/09/22  0428     --   --   --    CO2  --  21* 30 27   BUN  --  62.5* 54.2* 42.3*   CREATININE  --  5.07* 4.03*  3.05*   CALCIUM  --  8.1* 7.4* 7.6*   ALBUMIN  --  3.1* 3.0* 3.2*   ALKPHOS  --  88 82 82   ALT  --  5 <5 <5   AST  --  7 8 8          Microbiology Results (last 7 days)     Procedure Component Value Units Date/Time    GI Panel PCR Sendout [[id number]]     Order Status: Canceled Specimen: Rectal     Stool Culture [[id number]]  (Normal) Collected: 05/07/22 1220    Order Status: Completed Specimen: Stool Updated: 05/08/22 1038     Stool Culture Negative for Salmonella, Shigella, Campylobacter, Vibrio, Aeromonas, Pleisiomonas,Yersinia, or Shiga Toxin 1 and 2.    Clostridium Diff Toxin, A & B, EIA [[id number]] Collected: 05/07/22 1220    Order Status: Canceled Specimen: Stool Updated: 05/07/22 1236    Urine culture [[id number]] Collected: 05/06/22 0927    Order Status: Sent Specimen: Urine Updated: 05/06/22 1210           MRI Brain Without Contrast  Narrative: EXAMINATION:  MRI BRAIN WITHOUT CONTRAST    CLINICAL HISTORY:  Parkinsonian syndrome;    TECHNIQUE:  Multiplanar MRI sequences were performed of the brain without contrast.    COMPARISON:  None available    FINDINGS:  Bilateral cerebral periventricular and subcortical white matter variable size T2-FLAIR hyperintense signals are consistent with mild chronic microangiopathic ischemia. Involutional changes contribute to cerebellar and cerebral cortical volume loss. Gradient echo sequences demonstrate no evidence of de phasing artifact to suggest hemorrhagic byproducts. No evidence of diffusion restriction or ADC map signal drop out to suggest acute infarct. The sella and suprasellar areas are unremarkable.    The cerebellar tonsils are normally positioned. There is no acute intracranial hemorrhage, hydrocephalus, midline shift or mass effect. No acute extra axial fluid collections identified. The mastoid air cells are clear.  Impression: 1.  No acute intracranial findings identified.    2.  Chronic microangiopathic ischemia and atrophy.    .    Electronically signed  by: Diomedes Lopez  Date:    05/06/2022  Time:    17:30  US Retroperitoneal Complete  Narrative: EXAMINATION:  US RETROPERITONEAL COMPLETE    CLINICAL HISTORY:  Acute renal  failure;    TECHNIQUE:  Ultrasound evaluation of the kidneys, region of the ureters, and urinary bladder.    COMPARISON:  No relevant comparison studies available at the time of dictation.    FINDINGS:  No hydronephrosis. Overall renal echogenicity may be slightly increased.    Bladder has normal appearance with a calculated volume of 384 mL.    Imaged segments of the abdominal aorta normal in caliber.    Measurements:    - Right kidney: 12.4 cm in length    - Left kidney: 11.9 cm in length  Impression: No hydronephrosis.  Possible medical renal disease.    Electronically signed by: Angel Hong  Date:    05/06/2022  Time:    10:21  X-Ray Chest 1 View  Narrative: EXAMINATION:  XR CHEST 1 VIEW    CLINICAL HISTORY:  SOB;    TECHNIQUE:  Portable AP view of the chest.    COMPARISON:  Chest radiograph 01/08/2017.    FINDINGS:  The cardiomediastinal silhouette is stable in size and contour. Pulmonary vascularity is within normal limits. The lungs are well-inflated and are without focal consolidation, pleural effusion, or pneumothorax.    The imaged osseous structures and soft tissues are without acute abnormality.  Impression: No acute cardiopulmonary process.    Electronically signed by: Kevin Laird  Date:    05/06/2022  Time:    09:46      Scheduled Med:   amLODIPine  10 mg Oral Daily    buprenorphine HCL  8 mg Sublingual BID    buPROPion  150 mg Oral Daily    pantoprazole  40 mg Oral Daily        Continuous Infusions:   lactated ringers 100 mL/hr at 05/08/22 1431        PRN Meds:  acetaminophen, cyclobenzaprine, dextrose 10%, diphenoxylate-atropine 2.5-0.025 mg, glucagon (human recombinant), glucose, glucose, HYDROcodone-acetaminophen, insulin aspart U-100, naloxone, ondansetron, sodium chloride 0.9%       Assessment/Plan:  Acute renal  failure  Metabolic acidosis  Acute hypoxemic respiratory, improved and not requiring O2 supplementation  Hypomagnesemia  Macrocytic anemia  Tremors  Chronic diarrhea  Possible early dementia  History of hypertension  History of chronic back pain       Plan:   Renal function improves with IV fluids.   change fluids to 100 cc LR   -  hepatitis-B C complement level, HIV are negative.   - MATT panel complements, urine protein creatinine ratio  - patient describes intermittent chronic diarrhea stool cultures ova parasites are negative so far.   - anemia  - will check iron ferritin TIBC and transferrin, will.  Check B12 and folic acid tomorrow a.m.  - renal ultrasound negative    - continue appropriate home medications  - MRI is negative for acute disease   -  outpatient neurology consult    Harish Hernandez MD   05/09/2022

## 2022-05-09 NOTE — PROGRESS NOTES
"Nephrology Progress    Hospital Course:  66-year-old  female admitted with advanced azotemia with a creatinine of 6.59 and history of epigastric discomfort and diarrhea for months.  No significant lower urinary tract symptoms, no history of NSAIDs, no family history of renal disease.  Renal functions have improved somewhat to a GFR up to 16 this morning.  She still complains of some epigastric discomfort particularly postprandially and may need GI evaluation as an outpatient.          /80   Pulse 80   Temp 98.8 °F (37.1 °C) (Oral)   Resp 18   Ht 5' 0.63" (1.54 m)   Wt 59 kg (130 lb 1.1 oz)   SpO2 96%   BMI 24.88 kg/m²      Intake/Output Summary (Last 24 hours) at 5/9/2022 1300  Last data filed at 5/9/2022 0700  Gross per 24 hour   Intake 2040 ml   Output 3 ml   Net 2037 ml         Current Facility-Administered Medications:     acetaminophen tablet 650 mg, 650 mg, Oral, Q4H PRN, ORVILLE Whitehead    amLODIPine tablet 10 mg, 10 mg, Oral, Daily, Harish Hernandez MD, 10 mg at 05/09/22 0823    buprenorphine HCL SL tablet 8 mg, 8 mg, Sublingual, BID, Harish Hernandez MD, 8 mg at 05/09/22 0823    buPROPion TB24 tablet 150 mg, 150 mg, Oral, Daily, Harish Hernandez MD, 150 mg at 05/07/22 0859    cyclobenzaprine tablet 5 mg, 5 mg, Oral, TID PRN, Harish Hernandez MD    dextrose 10% bolus 125 mL, 12.5 g, Intravenous, PRN, ORVILLE Whitehead    diphenoxylate-atropine 2.5-0.025 mg per tablet 1 tablet, 1 tablet, Oral, Q6H PRN, Harish Hernandez MD    glucagon (human recombinant) injection 1 mg, 1 mg, Intramuscular, PRN, ORVILLE Whitehead    glucose chewable tablet 16 g, 16 g, Oral, PRN, ORVILLE Whitehead    glucose chewable tablet 24 g, 24 g, Oral, PRN, ORVILLE Whitehead    HYDROcodone-acetaminophen 5-325 mg per tablet 1 tablet, 1 tablet, Oral, Q6H PRN, Jelani Rushing, ORV[place]LE    insulin aspart U-100 injection 0-5 Units, 0-5 Units, Subcutaneous, QID (AC + HS) PRN, Jelani ELI" ORVILLE Rushing    lactated ringers infusion, , Intravenous, Continuous, Harish Hernandez MD, Last Rate: 100 mL/hr at 05/08/22 1431, New Bag at 05/08/22 1431    naloxone 0.4 mg/mL injection 0.02 mg, 0.02 mg, Intravenous, PRN, ORVILLE Whitehead    ondansetron injection 4 mg, 4 mg, Intravenous, Q8H PRN, ORVILLE Whitehead    pantoprazole EC tablet 40 mg, 40 mg, Oral, Daily, Harish Hernandez MD, 40 mg at 05/09/22 0823    sodium chloride 0.9% flush 10 mL, 10 mL, Intravenous, Q12H PRN, ORVILLE Whitehead      Physical Exam:    GEN: Awake and appropriate. Chronically ill appearance  HEENT: Atraumatic. EOMI, no icterus  NECK : No JVD. No adenopathy  CARD : RRR s rub or gallop  LUNGS : Clear to auscultation  ABD : Soft,non-tender. BS active positive epigastric fullness noted  EXT : No pitting edema . No cyanosis  NEURO : No obvious focal deficits        Laboratory:  Recent Results (from the past 24 hour(s))   POCT glucose    Collection Time: 05/08/22  5:29 PM   Result Value Ref Range    POCT Glucose 127 (H) 70 - 110 mg/dL   POCT Glucose, Hand-Held Device    Collection Time: 05/08/22  5:33 PM   Result Value Ref Range    POC Glucose 127 (A) 70 - 110 MG/DL   POCT glucose    Collection Time: 05/09/22 12:59 AM   Result Value Ref Range    POCT Glucose 142 (H) 70 - 110 mg/dL   CBC with Differential    Collection Time: 05/09/22  4:26 AM   Result Value Ref Range    WBC 6.5 4.5 - 11.5 x10(3)/mcL    RBC 2.93 (L) 4.20 - 5.40 x10(6)/mcL    Hgb 8.6 (L) 12.0 - 16.0 gm/dL    Hct 27.4 (L) 37.0 - 47.0 %    MCV 93.5 80.0 - 94.0 fL    MCH 29.4 27.0 - 31.0 pg    MCHC 31.4 (L) 33.0 - 36.0 mg/dL    RDW 13.2 11.5 - 17.0 %    Platelet 219 130 - 400 x10(3)/mcL    MPV 10.4 9.4 - 12.4 fL    Neutro Auto 53.2 %    Lymph Auto 32.0 %    Mono Auto 10.0 %    Eos Auto 3.9 %    Basophil Auto 0.6 %    Abs Lymph 2.07 0.6 - 4.6 x10(3)/mcL    Abs Neutro 3.4 2.1 - 9.2 x10(3)/mcL    Abs Mono 0.65 0.1 - 1.3 x10(3)/mcL    Abs Eos 0.25 0 - 0.9  x10(3)/mcL    Abs Baso 0.04 0 - 0.2 x10(3)/mcL    IG# 0.02 (H) 0 - 0.0155 x10(3)/mcL    IG% 0.3 0 - 0.43 %    NRBC% 0.0 %   Comprehensive Metabolic Panel    Collection Time: 05/09/22  4:28 AM   Result Value Ref Range    Sodium Level 140 136 - 145 mmol/L    Potassium Level 4.0 3.5 - 5.1 mmol/L    Chloride 99 98 - 107 mmol/L    Carbon Dioxide 27 23 - 31 mmol/L    Glucose Level 215 (H) 82 - 115 mg/dL    Blood Urea Nitrogen 42.3 (H) 9.8 - 20.1 mg/dL    Creatinine 3.05 (H) 0.55 - 1.02 mg/dL    Calcium Level Total 7.6 (L) 8.4 - 10.2 mg/dL    Protein Total 6.5 5.8 - 7.6 gm/dL    Albumin Level 3.2 (L) 3.4 - 4.8 gm/dL    Globulin 3.3 2.4 - 3.5 gm/dL    Albumin/Globulin Ratio 1.0 (L) 1.1 - 2.0 ratio    Bilirubin Total 0.3 <=1.5 mg/dL    Bilirubin Direct 0.1 0.0 - 0.5 mg/dL    Bilirubin Indirect 0.20 0.00 - 0.80 mg/dL    Alkaline Phosphatase 82 40 - 150 unit/L    Alanine Aminotransferase <5 0 - 55 unit/L    Aspartate Aminotransferase 8 5 - 34 unit/L    Estimated GFR-Non  16 mls/min/1.73/m2   Ferritin    Collection Time: 05/09/22  4:28 AM   Result Value Ref Range    Ferritin Level 175.44 4.63 - 204.00 ng/mL   Iron and TIBC    Collection Time: 05/09/22  4:28 AM   Result Value Ref Range    Iron Binding Capacity Unsaturated 183 70 - 310 ug/dL    Iron Level 19 (L) 50 - 170 ug/dL    Transferrin 195 173 - 360 mg/dL    Iron Binding Capacity Total 202 (L) 250 - 450 ug/dL    Iron Saturation 221 (H) 20 - 50 %   POCT glucose    Collection Time: 05/09/22  6:02 AM   Result Value Ref Range    POCT Glucose 159 (H) 70 - 110 mg/dL   POCT glucose    Collection Time: 05/09/22 11:27 AM   Result Value Ref Range    POCT Glucose 205 (H) 70 - 110 mg/dL     MATT panel, urine protein to creatinine ratio is pending., CRP is marginally elevated.    Assessment/Plan:    Nonoliguric AFSHAN-improving  History of recurrent diarrhea  Probable underlying CKD  Remote history of hepatoma  Postprandial epigastric discomfort  DJD and polymyalgia     MATT  panel is still pending.  Urine protein to creatinine ratio is still not in the lab.  Patient will collect that today.  We will start cholecalciferol 5000 units daily and if renal functions are slightly better tomorrow morning she will be able to be discharged to follow-up as an outpatient.    Cosom Mims

## 2022-05-10 VITALS
OXYGEN SATURATION: 98 % | BODY MASS INDEX: 24.55 KG/M2 | RESPIRATION RATE: 18 BRPM | HEIGHT: 61 IN | SYSTOLIC BLOOD PRESSURE: 150 MMHG | HEART RATE: 70 BPM | DIASTOLIC BLOOD PRESSURE: 75 MMHG | TEMPERATURE: 98 F | WEIGHT: 130.06 LBS

## 2022-05-10 LAB
ALBUMIN SERPL-MCNC: 3.1 GM/DL (ref 3.4–4.8)
ALBUMIN/GLOB SERPL: 0.8 RATIO (ref 1.1–2)
ALP SERPL-CCNC: 83 UNIT/L (ref 40–150)
ALT SERPL-CCNC: <5 UNIT/L (ref 0–55)
AST SERPL-CCNC: 9 UNIT/L (ref 5–34)
BASOPHILS # BLD AUTO: 0.03 X10(3)/MCL (ref 0–0.2)
BASOPHILS NFR BLD AUTO: 0.5 %
BILIRUBIN DIRECT+TOT PNL SERPL-MCNC: 0.2 MG/DL (ref 0–0.5)
BILIRUBIN DIRECT+TOT PNL SERPL-MCNC: 0.2 MG/DL (ref 0–0.8)
BILIRUBIN DIRECT+TOT PNL SERPL-MCNC: 0.4 MG/DL
BUN SERPL-MCNC: 33.1 MG/DL (ref 9.8–20.1)
CALCIUM SERPL-MCNC: 8.1 MG/DL (ref 8.4–10.2)
CHLORIDE SERPL-SCNC: 102 MMOL/L (ref 98–107)
CO2 SERPL-SCNC: 25 MMOL/L (ref 23–31)
CREAT SERPL-MCNC: 2.36 MG/DL (ref 0.55–1.02)
EOSINOPHIL # BLD AUTO: 0.19 X10(3)/MCL (ref 0–0.9)
EOSINOPHIL NFR BLD AUTO: 3.4 %
ERYTHROCYTE [DISTWIDTH] IN BLOOD BY AUTOMATED COUNT: 13.2 % (ref 11.5–17)
GLOBULIN SER-MCNC: 4 GM/DL (ref 2.4–3.5)
GLUCOSE SERPL-MCNC: 113 MG/DL (ref 82–115)
GLUCOSE SERPL-MCNC: 156 MG/DL (ref 70–110)
HCT VFR BLD AUTO: 27.2 % (ref 37–47)
HGB BLD-MCNC: 8.5 GM/DL (ref 12–16)
IMM GRANULOCYTES # BLD AUTO: 0.01 X10(3)/MCL (ref 0–0.02)
IMM GRANULOCYTES NFR BLD AUTO: 0.2 % (ref 0–0.43)
LYMPHOCYTES # BLD AUTO: 2.12 X10(3)/MCL (ref 0.6–4.6)
LYMPHOCYTES NFR BLD AUTO: 37.7 %
MCH RBC QN AUTO: 29 PG (ref 27–31)
MCHC RBC AUTO-ENTMCNC: 31.3 MG/DL (ref 33–36)
MCV RBC AUTO: 92.8 FL (ref 80–94)
MONOCYTES # BLD AUTO: 0.48 X10(3)/MCL (ref 0.1–1.3)
MONOCYTES NFR BLD AUTO: 8.5 %
NEUTROPHILS # BLD AUTO: 2.8 X10(3)/MCL (ref 2.1–9.2)
NEUTROPHILS NFR BLD AUTO: 49.7 %
NRBC BLD AUTO-RTO: 0 %
PLATELET # BLD AUTO: 214 X10(3)/MCL (ref 130–400)
PMV BLD AUTO: 10.1 FL (ref 9.4–12.4)
POCT GLUCOSE: 156 MG/DL (ref 70–110)
POTASSIUM SERPL-SCNC: 3.9 MMOL/L (ref 3.5–5.1)
PROT SERPL-MCNC: 7.1 GM/DL (ref 5.8–7.6)
RBC # BLD AUTO: 2.93 X10(6)/MCL (ref 4.2–5.4)
SODIUM SERPL-SCNC: 139 MMOL/L (ref 136–145)
VIT B12 SERPL-MCNC: 254 PG/ML (ref 213–816)
WBC # SPEC AUTO: 5.6 X10(3)/MCL (ref 4.5–11.5)

## 2022-05-10 PROCEDURE — 25000003 PHARM REV CODE 250: Performed by: STUDENT IN AN ORGANIZED HEALTH CARE EDUCATION/TRAINING PROGRAM

## 2022-05-10 PROCEDURE — 82607 VITAMIN B-12: CPT | Performed by: STUDENT IN AN ORGANIZED HEALTH CARE EDUCATION/TRAINING PROGRAM

## 2022-05-10 PROCEDURE — 82746 ASSAY OF FOLIC ACID SERUM: CPT | Performed by: STUDENT IN AN ORGANIZED HEALTH CARE EDUCATION/TRAINING PROGRAM

## 2022-05-10 PROCEDURE — 25000003 PHARM REV CODE 250: Performed by: INTERNAL MEDICINE

## 2022-05-10 PROCEDURE — 63600175 PHARM REV CODE 636 W HCPCS: Performed by: STUDENT IN AN ORGANIZED HEALTH CARE EDUCATION/TRAINING PROGRAM

## 2022-05-10 PROCEDURE — 85025 COMPLETE CBC W/AUTO DIFF WBC: CPT | Performed by: STUDENT IN AN ORGANIZED HEALTH CARE EDUCATION/TRAINING PROGRAM

## 2022-05-10 PROCEDURE — 80053 COMPREHEN METABOLIC PANEL: CPT | Performed by: STUDENT IN AN ORGANIZED HEALTH CARE EDUCATION/TRAINING PROGRAM

## 2022-05-10 PROCEDURE — 36415 COLL VENOUS BLD VENIPUNCTURE: CPT | Performed by: STUDENT IN AN ORGANIZED HEALTH CARE EDUCATION/TRAINING PROGRAM

## 2022-05-10 RX ORDER — CARVEDILOL 3.12 MG/1
3.12 TABLET ORAL 2 TIMES DAILY WITH MEALS
Qty: 60 TABLET | Refills: 11 | Status: SHIPPED | OUTPATIENT
Start: 2022-05-10 | End: 2023-05-10

## 2022-05-10 RX ORDER — CHOLECALCIFEROL (VITAMIN D3) 25 MCG
5000 TABLET ORAL DAILY
Qty: 150 TABLET | Refills: 0 | Status: SHIPPED | OUTPATIENT
Start: 2022-05-11 | End: 2022-06-10

## 2022-05-10 RX ADMIN — BUPRENORPHINE 8 MG: 8 TABLET SUBLINGUAL at 07:05

## 2022-05-10 RX ADMIN — PANTOPRAZOLE SODIUM 40 MG: 40 TABLET, DELAYED RELEASE ORAL at 07:05

## 2022-05-10 RX ADMIN — Medication 5000 UNITS: at 07:05

## 2022-05-10 RX ADMIN — AMLODIPINE BESYLATE 10 MG: 5 TABLET ORAL at 07:05

## 2022-05-10 RX ADMIN — SODIUM CHLORIDE, POTASSIUM CHLORIDE, SODIUM LACTATE AND CALCIUM CHLORIDE: 600; 310; 30; 20 INJECTION, SOLUTION INTRAVENOUS at 12:05

## 2022-05-10 NOTE — DISCHARGE SUMMARY
[facility]  Hospital Medicine Discharge Summary    Admit Date: 5/5/2022  Discharge Date and Time: 5/10/172330:27 AM  Admitting Physician: @[unfilled]  Discharging Physician: Harish Hernandez MD.  Primary Care Physician: Anyi Cruz, [facility]  Consults: Nephrology    Discharge Diagnoses:  AFSHAN  Anemia     Hospital Course:   A 66 years old female with a history of hypertension chronic back pain on Neurontin Suboxone and smoking presented to the hospital because she was having movement issues.  She also admitted that she was having diarrhea for last 3-4 months however especially lately with decreased she is complaining of tremors for the last month she also has some slowing in walking.  She also complains of combination recently.   She states that she is making good amount of urine, does not have fever chills sweating nausea vomiting abdominal pain, nausea having chest pain shortness of breath lightheadedness dizziness.   In the hospital patient was found to have elevated creatinine and BUN she was admitted to the hospital.      Patient was placed on bicarb drip with 75 cc/hour her renal function started to improve later was placed on 100 cc/hour LR her creatinine came down to 2.36.   She was found to have anemia MCV 92.8 hemoglobin 8.5, her iron 19 TIBC 200 to iron 19 TIBC 202 for recurrent is 175 transferring 195. Vitamin B12 a 254, iron saturation is 9.   ANCA negative, MATT pending.   Complement level HIV hepatitis panel is all negative  Stool culture stool pathogen negative retroperitoneal ultrasound for kidneys and MRI of the brain are negative for acute pathology.  Details are in the progress notes and also the chart.   She does not have any acute signs of bleeding.    I discussed with nephrologist he will see her as an outpatient and will wait for the results of MATT she will most likely require kidney biopsy.   I had a long discussion with her regarding her renals and needle  follow-up with Nephrology and also Gastroenterology for getting a colonoscopy.   I think patient needs to get colonoscopy due to chronic diarrhea which workup was negative in the hospital and also for anemia iron saturation is low although ferritin normal.  Chronic disease versus chronic bleeding.   Findings were discussed with the patient extensively.   Patient was discharged in a stable condition to home.     Vitals:  VITAL SIGNS: 24 HRS MIN & MAX LAST   Temp  Min: 97.7 °F (36.5 °C)  Max: 99 °F (37.2 °C) 98.2 °F (36.8 °C)   BP  Min: 138/87  Max: 164/79 (!) 154/87   Pulse  Min: 67  Max: 82  67   Resp  Min: 18  Max: 20 18   SpO2  Min: 93 %  Max: 97 % 97 %       Physical Exam:  X3 no acute distress following commands properly  Cardiac regular rate and rhythm, no murmur or gallop  Respiratory clear bilaterally on room air non labored  Abdomen soft nontender non distended no rigidity or rebound.   Extremities no edema    Procedures Performed: No admission procedures for hospital encounter.     Significant Diagnostic Studies: See Full reports for all details    Recent Labs   Lab 05/08/22  0343 05/09/22  0426 05/10/22  0348   WBC 5.9 6.5 5.6   RBC 2.72* 2.93* 2.93*   HGB 8.2* 8.6* 8.5*   HCT 25.4* 27.4* 27.2*   MCV 93.4 93.5 92.8   MCH 30.1 29.4 29.0   MCHC 32.3* 31.4* 31.3*   RDW 13.6 13.2 13.2   MPV 10.1 10.4 10.1       Recent Labs   Lab 05/06/22  0453 05/07/22  0349 05/08/22  0343 05/09/22  0428 05/10/22  0348     --   --   --   --    CO2  --    < > 30 27 25   BUN  --    < > 54.2* 42.3* 33.1*   CREATININE  --    < > 4.03* 3.05* 2.36*   CALCIUM  --    < > 7.4* 7.6* 8.1*   ALBUMIN  --    < > 3.0* 3.2* 3.1*   ALKPHOS  --    < > 82 82 83   ALT  --    < > <5 <5 <5   AST  --    < > 8 8 9    < > = values in this interval not displayed.        Microbiology Results (last 7 days)     Procedure Component Value Units Date/Time    Stool Culture [[id number]]  (Normal) Collected: 05/07/22 1220    Order Status: Completed  Specimen: Stool Updated: 05/09/22 1312     Stool Culture Negative for Salmonella, Shigella, Campylobacter, Vibrio, Aeromonas, Pleisiomonas,Yersinia, or Shiga Toxin 1 and 2.    GI Panel PCR Sendout [[id number]]     Order Status: Canceled Specimen: Rectal     Clostridium Diff Toxin, A & B, EIA [[id number]] Collected: 05/07/22 1220    Order Status: Canceled Specimen: Stool Updated: 05/07/22 1236    Urine culture [[id number]] Collected: 05/06/22 0927    Order Status: Sent Specimen: Urine Updated: 05/06/22 1210           MRI Brain Without Contrast  Narrative: EXAMINATION:  MRI BRAIN WITHOUT CONTRAST    CLINICAL HISTORY:  Parkinsonian syndrome;    TECHNIQUE:  Multiplanar MRI sequences were performed of the brain without contrast.    COMPARISON:  None available    FINDINGS:  Bilateral cerebral periventricular and subcortical white matter variable size T2-FLAIR hyperintense signals are consistent with mild chronic microangiopathic ischemia. Involutional changes contribute to cerebellar and cerebral cortical volume loss. Gradient echo sequences demonstrate no evidence of de phasing artifact to suggest hemorrhagic byproducts. No evidence of diffusion restriction or ADC map signal drop out to suggest acute infarct. The sella and suprasellar areas are unremarkable.    The cerebellar tonsils are normally positioned. There is no acute intracranial hemorrhage, hydrocephalus, midline shift or mass effect. No acute extra axial fluid collections identified. The mastoid air cells are clear.  Impression: 1.  No acute intracranial findings identified.    2.  Chronic microangiopathic ischemia and atrophy.    .    Electronically signed by: Diomedes Lopez  Date:    05/06/2022  Time:    17:30  US Retroperitoneal Complete  Narrative: EXAMINATION:  US RETROPERITONEAL COMPLETE    CLINICAL HISTORY:  Acute renal  failure;    TECHNIQUE:  Ultrasound evaluation of the kidneys, region of the ureters, and urinary bladder.    COMPARISON:  No relevant  comparison studies available at the time of dictation.    FINDINGS:  No hydronephrosis. Overall renal echogenicity may be slightly increased.    Bladder has normal appearance with a calculated volume of 384 mL.    Imaged segments of the abdominal aorta normal in caliber.    Measurements:    - Right kidney: 12.4 cm in length    - Left kidney: 11.9 cm in length  Impression: No hydronephrosis.  Possible medical renal disease.    Electronically signed by: Angel Hong  Date:    05/06/2022  Time:    10:21  X-Ray Chest 1 View  Narrative: EXAMINATION:  XR CHEST 1 VIEW    CLINICAL HISTORY:  SOB;    TECHNIQUE:  Portable AP view of the chest.    COMPARISON:  Chest radiograph 01/08/2017.    FINDINGS:  The cardiomediastinal silhouette is stable in size and contour. Pulmonary vascularity is within normal limits. The lungs are well-inflated and are without focal consolidation, pleural effusion, or pneumothorax.    The imaged osseous structures and soft tissues are without acute abnormality.  Impression: No acute cardiopulmonary process.    Electronically signed by: Kevin Laird  Date:    05/06/2022  Time:    09:46         Medication List      CONTINUE taking these medications    amLODIPine 10 MG tablet  Commonly known as: NORVASC     buprenorphine-naloxone 8-2 mg 8-2 mg  Commonly known as: SUBOXONE     buPROPion 150 MG TB24 tablet  Commonly known as: WELLBUTRIN XL     cholecalciferol (vitamin D3) 1,250 mcg (50,000 unit) capsule     diphenoxylate-atropine 2.5-0.025 mg 2.5-0.025 mg per tablet  Commonly known as: LOMOTIL     gabapentin 800 MG tablet  Commonly known as: NEURONTIN     omeprazole 20 mg Tbec        STOP taking these medications    chlorthalidone 25 MG Tab  Commonly known as: HYGROTEN     cyclobenzaprine 5 MG tablet  Commonly known as: FLEXERIL     DULoxetine 30 MG capsule  Commonly known as: CYMBALTA     lisinopriL 10 MG tablet             Explained in detail to the patient about the discharge plan, medications, and  follow-up visits. Pt understands and agrees with the treatment plan  Discharged Condition: stable  Diet-   Dietary Orders (From admission, onward)     Start     Ordered    05/06/22 0758  DIET RENAL NON-DIALYSIS  Diet effective now         05/06/22 0757               Medications Per DC med rec  Activities as tolerated   Follow-up Information     Cosmo Mims MD. Go on 6/8/2022.    Specialty: Nephrology  Why: 02:45pm (Dr. Mims)  Please do a CMP 4 days before  Contact information:  [address] [facility] [address] [phone]                       For further questions contact hospitalist office    Discharge time 33 minutes    For worsening symptoms, chest pain, shortness of breath, increased abdominal pain, high grade fever, stroke or stroke like symptoms, immediately go to the nearest Emergency Room or call 911 as soon as possible.      Harish Daley M.D, on 5/10/2022. at 11:27 AM.

## 2022-05-10 NOTE — PROGRESS NOTES
"Nephrology Progress    Hospital Course:  66-year-old  female admitted with advanced azotemia with a creatinine of 6.59 and history of epigastric discomfort and diarrhea for months.  No significant lower urinary tract symptoms, no history of NSAIDs, no family history of renal disease.  Renal functions have improved somewhat to a GFR up to 16 this morning.  She still complains of some epigastric discomfort particularly postprandially and may need GI evaluation as an outpatient.  She is generally feeling improved and her GFR continues to slowly improve as well          BP (!) 154/87   Pulse 67   Temp 98.2 °F (36.8 °C) (Oral)   Resp 18   Ht 5' 0.63" (1.54 m)   Wt 59 kg (130 lb 1.1 oz)   SpO2 97%   BMI 24.88 kg/m²      Intake/Output Summary (Last 24 hours) at 5/10/2022 1009  Last data filed at 5/10/2022 0600  Gross per 24 hour   Intake 2400 ml   Output 6 ml   Net 2394 ml         Current Facility-Administered Medications:     acetaminophen tablet 650 mg, 650 mg, Oral, Q4H PRN, ORVILLE Whitehead    amLODIPine tablet 10 mg, 10 mg, Oral, Daily, Harish Hernandez MD, 10 mg at 05/10/22 0758    buprenorphine HCL SL tablet 8 mg, 8 mg, Sublingual, BID, Harish Hernandez MD, 8 mg at 05/10/22 0759    buPROPion TB24 tablet 150 mg, 150 mg, Oral, Daily, Harish Hernandez MD, 150 mg at 05/07/22 0859    cyclobenzaprine tablet 5 mg, 5 mg, Oral, TID PRN, Harish Hernandez MD    dextrose 10% bolus 125 mL, 12.5 g, Intravenous, PRN, ORVILLE Whitehead    diphenoxylate-atropine 2.5-0.025 mg per tablet 1 tablet, 1 tablet, Oral, Q6H PRN, Harish Hernandez MD    glucagon (human recombinant) injection 1 mg, 1 mg, Intramuscular, PRN, ORVILLE Whitehead    glucose chewable tablet 16 g, 16 g, Oral, PRN, ORVILLE Whitehead    glucose chewable tablet 24 g, 24 g, Oral, PRN, Jeneen A Black-Leggett, FNP    HYDROcodone-acetaminophen 5-325 mg per tablet 1 tablet, 1 tablet, Oral, Q6H PRN, Jelani Rushing, FNP    insulin " aspart U-100 injection 0-5 Units, 0-5 Units, Subcutaneous, QID (AC + HS) PRN, Jelani Rushing, ORVILLE    lactated ringers infusion, , Intravenous, Continuous, Harish Hernandez MD, Last Rate: 100 mL/hr at 05/10/22 0053, New Bag at 05/10/22 0053    naloxone 0.4 mg/mL injection 0.02 mg, 0.02 mg, Intravenous, PRN, ORVILLE Whitehead    ondansetron injection 4 mg, 4 mg, Intravenous, Q8H PRN, Jelani Rushing, ORVILLE    pantoprazole EC tablet 40 mg, 40 mg, Oral, Daily, Harish Hernandez MD, 40 mg at 05/10/22 0759    sodium chloride 0.9% flush 10 mL, 10 mL, Intravenous, Q12H PRN, ORVILLE Whitehead    vitamin D 1000 units tablet 5,000 Units, 5,000 Units, Oral, Daily, Cosmo Mims MD, 5,000 Units at 05/10/22 0758      Physical Exam:    GEN: Awake and appropriate. Chronically ill appearance  HEENT: Atraumatic. EOMI, no icterus  NECK : No JVD. No adenopathy  CARD : RRR s rub or gallop  LUNGS : Clear to auscultation  ABD : Soft,non-tender. BS active positive epigastric fullness noted  EXT : No pitting edema . No cyanosis  NEURO : No obvious focal deficits        Laboratory:  Recent Results (from the past 24 hour(s))   POCT glucose    Collection Time: 05/09/22 11:27 AM   Result Value Ref Range    POCT Glucose 205 (H) 70 - 110 mg/dL   Protein/Creatinine Ratio, Urine    Collection Time: 05/09/22  1:51 PM   Result Value Ref Range    Urine Protein Level 9.5 mg/dL    Urine Creatinine 34.4 (L) 47.0 - 110.0 mg/dL    Urine Protein/Creatinine Ratio 276.2 (H) <=200.0 mg/gm Cr   Sedimentation rate    Collection Time: 05/09/22  2:40 PM   Result Value Ref Range    Sed Rate 97 (H) 0 - 20 mm/hr   POCT glucose    Collection Time: 05/09/22  4:33 PM   Result Value Ref Range    POCT Glucose 157 (H) 70 - 110 mg/dL   POCT Glucose, Hand-Held Device    Collection Time: 05/10/22 12:00 AM   Result Value Ref Range    POC Glucose 156 (A) 70 - 110 MG/DL   POCT glucose    Collection Time: 05/10/22 12:07 AM   Result Value Ref Range    POCT  Glucose 156 (H) 70 - 110 mg/dL   Vitamin B12    Collection Time: 05/10/22  3:48 AM   Result Value Ref Range    Vitamin B12 Level 254 213 - 816 pg/mL   Comprehensive Metabolic Panel    Collection Time: 05/10/22  3:48 AM   Result Value Ref Range    Sodium Level 139 136 - 145 mmol/L    Potassium Level 3.9 3.5 - 5.1 mmol/L    Chloride 102 98 - 107 mmol/L    Carbon Dioxide 25 23 - 31 mmol/L    Glucose Level 113 82 - 115 mg/dL    Blood Urea Nitrogen 33.1 (H) 9.8 - 20.1 mg/dL    Creatinine 2.36 (H) 0.55 - 1.02 mg/dL    Calcium Level Total 8.1 (L) 8.4 - 10.2 mg/dL    Protein Total 7.1 5.8 - 7.6 gm/dL    Albumin Level 3.1 (L) 3.4 - 4.8 gm/dL    Globulin 4.0 (H) 2.4 - 3.5 gm/dL    Albumin/Globulin Ratio 0.8 (L) 1.1 - 2.0 ratio    Bilirubin Total 0.4 <=1.5 mg/dL    Bilirubin Direct 0.2 0.0 - 0.5 mg/dL    Bilirubin Indirect 0.20 0.00 - 0.80 mg/dL    Alkaline Phosphatase 83 40 - 150 unit/L    Alanine Aminotransferase <5 0 - 55 unit/L    Aspartate Aminotransferase 9 5 - 34 unit/L    Estimated GFR-Non  22 mls/min/1.73/m2   CBC with Differential    Collection Time: 05/10/22  3:48 AM   Result Value Ref Range    WBC 5.6 4.5 - 11.5 x10(3)/mcL    RBC 2.93 (L) 4.20 - 5.40 x10(6)/mcL    Hgb 8.5 (L) 12.0 - 16.0 gm/dL    Hct 27.2 (L) 37.0 - 47.0 %    MCV 92.8 80.0 - 94.0 fL    MCH 29.0 27.0 - 31.0 pg    MCHC 31.3 (L) 33.0 - 36.0 mg/dL    RDW 13.2 11.5 - 17.0 %    Platelet 214 130 - 400 x10(3)/mcL    MPV 10.1 9.4 - 12.4 fL    Neutro Auto 49.7 %    Lymph Auto 37.7 %    Mono Auto 8.5 %    Eos Auto 3.4 %    Basophil Auto 0.5 %    Abs Lymph 2.12 0.6 - 4.6 x10(3)/mcL    Abs Neutro 2.8 2.1 - 9.2 x10(3)/mcL    Abs Mono 0.48 0.1 - 1.3 x10(3)/mcL    Abs Eos 0.19 0 - 0.9 x10(3)/mcL    Abs Baso 0.03 0 - 0.2 x10(3)/mcL    IG# 0.01 0 - 0.0155 x10(3)/mcL    IG% 0.2 0 - 0.43 %    NRBC% 0.0 %     MATT panel is pending., CRP is marginally elevated and sedimentation rate is also elevated at 97.   Urine protein to creatinine ratio only 276 mg  and not consistent with a glomerular pathology.        Assessment/Plan:    Nonoliguric AFSHAN-improving  History of recurrent diarrhea  Probable underlying CKD  Remote history of hepatoma  Postprandial epigastric discomfort  DJD and polymyalgia and elevated inflammatory  Secondary hyperparathyroidism/vitamin-D deficiency     MATT panel is still pending.  No significant urine protein to creatinine noted.  I would continue cholecalciferol 5000 units daily on discharge and I will give her a follow-up appointment to see me in about 3 or 4 weeks with CMP before her visit.  Patient may be discharged today from my perspective.  Please reconsult us p.r.n.  I am expecting renal function to slowly continue to improve over time.  She may however have some remaining chronic renal dysfunction related to the severity of her AFSHAN.    Cosmo Mims

## 2022-05-11 ENCOUNTER — PATIENT OUTREACH (OUTPATIENT)
Dept: ADMINISTRATIVE | Facility: CLINIC | Age: 67
End: 2022-05-11
Payer: MEDICAID

## 2022-05-11 LAB
CRP SERPL-MCNC: 22 MG/L
FOLATE SERPL-MCNC: 8.1 NG/ML (ref 7–31.4)
IGA SERPL-MCNC: 230 MG/DL (ref 69–517)
IGG SERPL-MCNC: 1471 MG/DL (ref 522–1631)
IGM SERPL-MCNC: 49 MG/DL (ref 33–293)
KAPPA LC FREE SER-MCNC: 9.75 MG/DL (ref 0.33–1.94)
KAPPA LC FREE/LAMBDA FREE SER: 2.01 {RATIO} (ref 0.26–1.65)
LAMBDA LC FREE SERPL-MCNC: 4.85 MG/DL (ref 0.57–2.63)

## 2022-05-11 NOTE — PROGRESS NOTES
C3 nurse attempted to contact Kriss Vallejo for a TCC post hospital discharge follow up call. No answer. Left Voicemail with call back number The patient has a scheduled HOSFU appointment with Dr. Mims on 06/08/2022 @ 254 pm.   Appointment with GI, Dr. Mainor Dorsey 05/24/2022.

## 2022-05-12 ENCOUNTER — PATIENT MESSAGE (OUTPATIENT)
Dept: ADMINISTRATIVE | Facility: OTHER | Age: 67
End: 2022-05-12
Payer: MEDICAID

## 2022-05-13 NOTE — PROGRESS NOTES
C3 nurse attempted to contact Kriss Vallejo for a TCC post hospital discharge follow up call. Left voicemail with call back number. The patient has a scheduled HOSFU appointment with Dr. Mims on 06/08/2022 @ 245 pm.   Appointment with Mainor CASTELLANO 05/24/2022.

## 2022-05-24 LAB
ALBUMIN % SPEP (OHS): ABNORMAL
ALBUMIN SERPL-MCNC: 3.6 GM/DL (ref 3.4–4.8)
ALBUMIN/GLOB SERPL: 1 RATIO (ref 1.1–2)
ALPHA 1 GLOB (OHS): 0.49 GM/DL (ref 0–0.4)
ALPHA 1 GLOB% (OHS): ABNORMAL
ALPHA 2 GLOB % (OHS): ABNORMAL
ALPHA 2 GLOB (OHS): 1.04 GM/DL (ref 0.4–1)
BETA GLOB (OHS): 0.81 GM/DL (ref 0.7–1.3)
BETA GLOB% (OHS): ABNORMAL
GAMMA GLOBULIN % (OHS): ABNORMAL
GAMMA GLOBULIN (OHS): 1.32 GM/DL (ref 0.4–1.8)
GLOBULIN SER-MCNC: 3.7 GM/DL (ref 2.4–3.5)
IFE INTERPRETATION (OHS): ABNORMAL
M SPIKE % (OHS): ABNORMAL
M SPIKE (OHS): ABNORMAL
PROT SERPL-MCNC: 7.3 GM/DL (ref 5.8–7.6)

## 2022-12-01 DIAGNOSIS — N18.4 CHRONIC KIDNEY DISEASE, STAGE IV (SEVERE): Primary | ICD-10-CM

## 2024-04-18 ENCOUNTER — HOSPITAL ENCOUNTER (EMERGENCY)
Facility: HOSPITAL | Age: 69
Discharge: HOME | End: 2024-04-18
Attending: STUDENT IN AN ORGANIZED HEALTH CARE EDUCATION/TRAINING PROGRAM
Payer: MEDICARE

## 2024-04-18 VITALS
HEART RATE: 60 BPM | HEIGHT: 61 IN | OXYGEN SATURATION: 98 % | TEMPERATURE: 99 F | SYSTOLIC BLOOD PRESSURE: 144 MMHG | DIASTOLIC BLOOD PRESSURE: 66 MMHG | RESPIRATION RATE: 18 BRPM | WEIGHT: 149 LBS | BODY MASS INDEX: 28.13 KG/M2

## 2024-04-18 DIAGNOSIS — R52 PAIN: ICD-10-CM

## 2024-04-18 DIAGNOSIS — R10.31 RIGHT GROIN PAIN: Primary | ICD-10-CM

## 2024-04-18 DIAGNOSIS — M25.562 ACUTE PAIN OF LEFT KNEE: ICD-10-CM

## 2024-04-18 LAB
ALBUMIN SERPL-MCNC: 3.7 G/DL (ref 3.4–4.8)
ALBUMIN/GLOB SERPL: 1.1 RATIO (ref 1.1–2)
ALP SERPL-CCNC: 106 UNIT/L (ref 40–150)
ALT SERPL-CCNC: 8 UNIT/L (ref 0–55)
APPEARANCE UR: CLEAR
AST SERPL-CCNC: 14 UNIT/L (ref 5–34)
BACTERIA #/AREA URNS AUTO: ABNORMAL /HPF
BASOPHILS # BLD AUTO: 0.04 X10(3)/MCL
BASOPHILS NFR BLD AUTO: 0.5 %
BILIRUB SERPL-MCNC: 0.3 MG/DL
BILIRUB UR QL STRIP.AUTO: NEGATIVE
BUN SERPL-MCNC: 23.4 MG/DL (ref 9.8–20.1)
CALCIUM SERPL-MCNC: 9.2 MG/DL (ref 8.4–10.2)
CHLORIDE SERPL-SCNC: 108 MMOL/L (ref 98–107)
CO2 SERPL-SCNC: 25 MMOL/L (ref 23–31)
COLOR UR AUTO: YELLOW
CREAT SERPL-MCNC: 1.13 MG/DL (ref 0.55–1.02)
EOSINOPHIL # BLD AUTO: 0.15 X10(3)/MCL (ref 0–0.9)
EOSINOPHIL NFR BLD AUTO: 2 %
ERYTHROCYTE [DISTWIDTH] IN BLOOD BY AUTOMATED COUNT: 12.8 % (ref 11.5–17)
GFR SERPLBLD CREATININE-BSD FMLA CKD-EPI: 52 MLS/MIN/1.73/M2
GLOBULIN SER-MCNC: 3.3 GM/DL (ref 2.4–3.5)
GLUCOSE SERPL-MCNC: 88 MG/DL (ref 82–115)
GLUCOSE UR QL STRIP.AUTO: ABNORMAL
HCT VFR BLD AUTO: 35.8 % (ref 37–47)
HGB BLD-MCNC: 11.8 G/DL (ref 12–16)
IMM GRANULOCYTES # BLD AUTO: 0.02 X10(3)/MCL (ref 0–0.04)
IMM GRANULOCYTES NFR BLD AUTO: 0.3 %
KETONES UR QL STRIP.AUTO: NEGATIVE
LEUKOCYTE ESTERASE UR QL STRIP.AUTO: 250
LYMPHOCYTES # BLD AUTO: 2.91 X10(3)/MCL (ref 0.6–4.6)
LYMPHOCYTES NFR BLD AUTO: 39.4 %
MCH RBC QN AUTO: 31.6 PG (ref 27–31)
MCHC RBC AUTO-ENTMCNC: 33 G/DL (ref 33–36)
MCV RBC AUTO: 96 FL (ref 80–94)
MONOCYTES # BLD AUTO: 0.56 X10(3)/MCL (ref 0.1–1.3)
MONOCYTES NFR BLD AUTO: 7.6 %
MUCOUS THREADS URNS QL MICRO: ABNORMAL /LPF
NEUTROPHILS # BLD AUTO: 3.71 X10(3)/MCL (ref 2.1–9.2)
NEUTROPHILS NFR BLD AUTO: 50.2 %
NITRITE UR QL STRIP.AUTO: NEGATIVE
NRBC BLD AUTO-RTO: 0 %
PH UR STRIP.AUTO: 5.5 [PH]
PLATELET # BLD AUTO: 253 X10(3)/MCL (ref 130–400)
PMV BLD AUTO: 9.8 FL (ref 7.4–10.4)
POTASSIUM SERPL-SCNC: 4.3 MMOL/L (ref 3.5–5.1)
PROT SERPL-MCNC: 7 GM/DL (ref 5.8–7.6)
PROT UR QL STRIP.AUTO: ABNORMAL
RBC # BLD AUTO: 3.73 X10(6)/MCL (ref 4.2–5.4)
RBC #/AREA URNS AUTO: ABNORMAL /HPF
RBC UR QL AUTO: NEGATIVE
SODIUM SERPL-SCNC: 139 MMOL/L (ref 136–145)
SP GR UR STRIP.AUTO: 1.03 (ref 1–1.03)
SQUAMOUS #/AREA URNS LPF: ABNORMAL /HPF
UROBILINOGEN UR STRIP-ACNC: NORMAL
WBC # SPEC AUTO: 7.39 X10(3)/MCL (ref 4.5–11.5)
WBC #/AREA URNS AUTO: ABNORMAL /HPF

## 2024-04-18 PROCEDURE — 25000003 PHARM REV CODE 250: Performed by: STUDENT IN AN ORGANIZED HEALTH CARE EDUCATION/TRAINING PROGRAM

## 2024-04-18 PROCEDURE — 99284 EMERGENCY DEPT VISIT MOD MDM: CPT | Mod: 25

## 2024-04-18 PROCEDURE — 80053 COMPREHEN METABOLIC PANEL: CPT | Performed by: STUDENT IN AN ORGANIZED HEALTH CARE EDUCATION/TRAINING PROGRAM

## 2024-04-18 PROCEDURE — 85025 COMPLETE CBC W/AUTO DIFF WBC: CPT | Performed by: STUDENT IN AN ORGANIZED HEALTH CARE EDUCATION/TRAINING PROGRAM

## 2024-04-18 PROCEDURE — 63600175 PHARM REV CODE 636 W HCPCS: Performed by: STUDENT IN AN ORGANIZED HEALTH CARE EDUCATION/TRAINING PROGRAM

## 2024-04-18 PROCEDURE — 81001 URINALYSIS AUTO W/SCOPE: CPT | Performed by: STUDENT IN AN ORGANIZED HEALTH CARE EDUCATION/TRAINING PROGRAM

## 2024-04-18 PROCEDURE — 96372 THER/PROPH/DIAG INJ SC/IM: CPT | Performed by: STUDENT IN AN ORGANIZED HEALTH CARE EDUCATION/TRAINING PROGRAM

## 2024-04-18 RX ORDER — METHOCARBAMOL 500 MG/1
500 TABLET, FILM COATED ORAL 3 TIMES DAILY
Qty: 18 TABLET | Refills: 0 | Status: SHIPPED | OUTPATIENT
Start: 2024-04-18 | End: 2024-04-24

## 2024-04-18 RX ORDER — METHOCARBAMOL 500 MG/1
500 TABLET, FILM COATED ORAL
Status: COMPLETED | OUTPATIENT
Start: 2024-04-18 | End: 2024-04-18

## 2024-04-18 RX ORDER — DEXAMETHASONE SODIUM PHOSPHATE 4 MG/ML
8 INJECTION, SOLUTION INTRA-ARTICULAR; INTRALESIONAL; INTRAMUSCULAR; INTRAVENOUS; SOFT TISSUE
Status: COMPLETED | OUTPATIENT
Start: 2024-04-18 | End: 2024-04-18

## 2024-04-18 RX ORDER — HYDROCODONE BITARTRATE AND ACETAMINOPHEN 5; 325 MG/1; MG/1
1 TABLET ORAL
Status: DISCONTINUED | OUTPATIENT
Start: 2024-04-18 | End: 2024-04-18

## 2024-04-18 RX ORDER — KETOROLAC TROMETHAMINE 30 MG/ML
15 INJECTION, SOLUTION INTRAMUSCULAR; INTRAVENOUS
Status: COMPLETED | OUTPATIENT
Start: 2024-04-18 | End: 2024-04-18

## 2024-04-18 RX ADMIN — DEXAMETHASONE SODIUM PHOSPHATE 8 MG: 4 INJECTION, SOLUTION INTRA-ARTICULAR; INTRALESIONAL; INTRAMUSCULAR; INTRAVENOUS; SOFT TISSUE at 08:04

## 2024-04-18 RX ADMIN — KETOROLAC TROMETHAMINE 15 MG: 30 INJECTION, SOLUTION INTRAMUSCULAR at 07:04

## 2024-04-18 RX ADMIN — METHOCARBAMOL 500 MG: 500 TABLET ORAL at 08:04

## 2024-04-18 NOTE — ED PROVIDER NOTES
Encounter Date: 4/18/2024    SCRIBE #1 NOTE: I, America Carrasco, am scribing for, and in the presence of,  Franklin Lopez MD. I have scribed the following portions of the note - Other sections scribed: HPI, ROS, PE.       History     Chief Complaint   Patient presents with    Groin Pain     Pt c/o pain to R groin radiating down into RLE x1 month. Denies trauma.     68 year old female with a history of sciatica, DM on metformin, HTN on lisinopril, HLD on Rosuvastatin, and presents to ED with daughter for intermittent, throbbing right leg pain starting from groin shooting down right knee onset 1 month ago, worsening since Tuesday. Pt reports pain feels different from previous sciatica pain. States she saw her cardiologist on Monday and was concerned for blockages. Denies blood thinners and history of blood clots. Notes she is normally ambulatory, but recently pain worsens with weight bearing. She also takes gabapentin and omeprazole.    Cardiologist: Ryan Lancaster MD     The history is provided by the patient. No  was used.     Review of patient's allergies indicates:  No Known Allergies  No past medical history on file.  No past surgical history on file.  No family history on file.     Review of Systems   Musculoskeletal:  Positive for arthralgias (R knee).        R groin       Physical Exam     Initial Vitals [04/18/24 0450]   BP Pulse Resp Temp SpO2   (!) 153/93 69 18 98.7 °F (37.1 °C) 97 %      MAP       --         Physical Exam    Nursing note and vitals reviewed.  Constitutional: She appears well-developed and well-nourished. She is not diaphoretic. No distress.   HENT:   Head: Normocephalic and atraumatic.   Right Ear: External ear normal.   Left Ear: External ear normal.   Nose: Nose normal.   Eyes: Conjunctivae and EOM are normal. Pupils are equal, round, and reactive to light. Right eye exhibits no discharge. Left eye exhibits no discharge.   Cardiovascular:  Normal rate, regular  rhythm, normal heart sounds and intact distal pulses.     Exam reveals no gallop and no friction rub.       No murmur heard.  Pulses:       Dorsalis pedis pulses are 2+ on the right side and 2+ on the left side.   Pulmonary/Chest: Breath sounds normal. No respiratory distress. She has no wheezes. She has no rhonchi. She has no rales. She exhibits no tenderness.   Abdominal: Abdomen is soft. Bowel sounds are normal. She exhibits no distension and no mass. There is no abdominal tenderness. There is no rebound and no guarding.   Musculoskeletal:         General: Tenderness present. No edema.      Comments: Tenderness to palpation of anterior and lateral right knee into anterior right hip     Neurological: She is alert and oriented to person, place, and time. No cranial nerve deficit or sensory deficit.   Skin: Skin is warm and dry. Capillary refill takes less than 2 seconds. No erythema. No pallor.         ED Course   Procedures  Labs Reviewed   COMPREHENSIVE METABOLIC PANEL - Abnormal; Notable for the following components:       Result Value    Chloride 108 (*)     Blood Urea Nitrogen 23.4 (*)     Creatinine 1.13 (*)     All other components within normal limits   URINALYSIS, REFLEX TO URINE CULTURE - Abnormal; Notable for the following components:    Protein, UA 1+ (*)     Glucose, UA Trace (*)     Leukocyte Esterase,  (*)     Squamous Epithelial Cells, UA Occasional (*)     Mucous, UA Trace (*)     All other components within normal limits   CBC WITH DIFFERENTIAL - Abnormal; Notable for the following components:    RBC 3.73 (*)     Hgb 11.8 (*)     Hct 35.8 (*)     MCV 96.0 (*)     MCH 31.6 (*)     All other components within normal limits   CBC W/ AUTO DIFFERENTIAL    Narrative:     The following orders were created for panel order CBC auto differential.  Procedure                               Abnormality         Status                     ---------                               -----------         ------                      CBC with Differential[[id number]]        Abnormal            Final result                 Please view results for these tests on the individual orders.          Imaging Results              X-Ray Knee 3 View Right (In process)                      X-Ray Knee 3 View Left (In process)                      X-Ray Hip 2 or 3 views Right (with Pelvis when performed) (In process)                      Medications   ketorolac injection 15 mg (15 mg Intramuscular Given 4/18/24 0718)   dexAMETHasone injection 8 mg (8 mg Intramuscular Given 4/18/24 0819)   methocarbamoL tablet 500 mg (500 mg Oral Given 4/18/24 0819)     Medical Decision Making  The differential diagnosis includes, but is not limited to fracture, laceration, abrasion, contusion, arthritis, muscle strain, joint sprain.      Patient is awake alert well-appearing.  No obvious swelling or deformity to either hip nor knee.  Full range of motion albeit with some mild discomfort.  No obvious swelling no overlying skin changes.  No stigmata of septic joint.  Her plain films are unrevealing for any acute process.  Possibly some developing arthritis but no acute fracture.  No obvious effusion on physical exam or on imaging.  Her labs were similarly unrevealing.  She reports that she was scheduled to see her cardiologist with concern for her possibly developing some artery disease.  She was strong pulses here in the emergency department to bilateral lower extremities that are equal.  She was a cap refill that is less than 2 seconds.  She was no obvious swelling of the leg.  Her calf is nontender on my evaluation.  No significant tenderness palpation or swelling behind the knee.  I believe this time patient was suitable for discharge home.  She was strongly encouraged to follow-up with her cardiologist as scheduled.  I will refer to Orthopedic surgery in case this is a primarily joint issue.  Patient was comfortable with plan.  We will discharge with Robaxin  due to her being on Suboxone. Return precautions given.  Questions invited, questions answered to the best my ability.  Patient discharged home condition stable.      Amount and/or Complexity of Data Reviewed  External Data Reviewed: notes.     Details: See ED course  Labs: ordered. Decision-making details documented in ED Course.  Radiology: ordered and independent interpretation performed. Decision-making details documented in ED Course.    Risk  OTC drugs.  Prescription drug management.            Scribe Attestation:   Scribe #1: I performed the above scribed service and the documentation accurately describes the services I performed. I attest to the accuracy of the note.    Attending Attestation:           Physician Attestation for Scribe:  Physician Attestation Statement for Scribe #1: I, Franklin Lopez MD, reviewed documentation, as scribed by America Carrasco in my presence, and it is both accurate and complete.             ED Course as of 04/18/24 0908   Thu Apr 18, 2024   0631 Chart review reveals history of CKD followed by Dr. Ovalle, history of radiculopathy as well [MM]   0701 CBC auto differential(!)  Improved anemia.  No leukocytosis. [MM]   0701 Comprehensive metabolic panel(!)  Significant improvement in creatinine.  No electrolyte abnormality. [MM]   0702 X-Ray Hip 2 or 3 views Right (with Pelvis when performed)  Possible other changes but no acute fracture noted [MM]      ED Course User Index  [MM] Franklin Lopez MD                           Clinical Impression:  Final diagnoses:  [R52] Pain  [R10.31] Right groin pain (Primary)  [M25.562] Acute pain of left knee          ED Disposition Condition    Discharge Stable          ED Prescriptions       Medication Sig Dispense Start Date End Date Auth. Provider    methocarbamoL (ROBAXIN) 500 MG Tab Take 1 tablet (500 mg total) by mouth 3 (three) times daily. for 6 days 18 tablet 4/18/2024 4/24/2024 Franklin Lopez MD          Follow-up Information        Follow up With Specialties Details Why Contact Info    Anyi Cruz, [facility] Internal Medicine, Family Medicine, Behavioral Health, Addiction Medicine Call   [address]  [address]  [facility] [address]  [phone]      Faisal Badillo MD Orthopedic Surgery Call   [address]  [address]  [facility] [address]  [phone]               Franklin Lopez MD  04/18/24 0914

## 2024-04-18 NOTE — DISCHARGE INSTRUCTIONS
Thanks for letting us take care of you today!  It is our goal to give you courteous care and to keep you comfortable and informed, if you have any questions before you leave I will be happy to try and answer them.    Here is some advice after your visit:    Your visit in the emergency department is NOT definitive care - please follow-up with your primary care doctor and/or specialist within 1-2 days. Please return to the emergency department if you develop worsening symptoms including: fever, chills, chest pain, shortness of breath, weakness, numbness, tingling, nausea, vomiting, inability to eat, drink, or take your medication. Please return if you have any worsening in your condition or if you have any other concerns.    If you had radiology exams like an XRAY or CT in the emergency Department the interpreation on them may be preliminary - there may be less time sensitive findings on the reports please obtain these reports within 24 hours from the hospital or by using your out on your mobile phone to access records.  Bring these to your primary care doctor and/or specialist for further review of incidental findings.    Please review any LAB WORK from your visit today with your primary care physician.    You have been prescribed methocarbamol/Robaxin.  This is a muscle relaxer.  This may make you drowsy.  Please do not take with other sedating medications including hydrocodone/acetaminophen (Norco) or with alcohol.  Do not take and drive.    Please be sure to keep your follow-up appointment with your cardiologist.  You have also been referred to Orthopedic surgery.  Contact information is provided but a referral has been placed convenience.